# Patient Record
Sex: FEMALE | Race: WHITE | NOT HISPANIC OR LATINO
[De-identification: names, ages, dates, MRNs, and addresses within clinical notes are randomized per-mention and may not be internally consistent; named-entity substitution may affect disease eponyms.]

---

## 2017-01-30 ENCOUNTER — APPOINTMENT (OUTPATIENT)
Dept: SURGERY | Facility: CLINIC | Age: 67
End: 2017-01-30

## 2017-01-30 DIAGNOSIS — C73 MALIGNANT NEOPLASM OF THYROID GLAND: ICD-10-CM

## 2017-07-18 ENCOUNTER — APPOINTMENT (OUTPATIENT)
Dept: NEUROSURGERY | Facility: CLINIC | Age: 67
End: 2017-07-18

## 2017-07-18 VITALS
HEART RATE: 72 BPM | HEIGHT: 66 IN | BODY MASS INDEX: 36.64 KG/M2 | DIASTOLIC BLOOD PRESSURE: 80 MMHG | WEIGHT: 228 LBS | SYSTOLIC BLOOD PRESSURE: 130 MMHG

## 2017-07-24 ENCOUNTER — FORM ENCOUNTER (OUTPATIENT)
Age: 67
End: 2017-07-24

## 2017-07-25 ENCOUNTER — APPOINTMENT (OUTPATIENT)
Dept: NEUROSURGERY | Facility: CLINIC | Age: 67
End: 2017-07-25

## 2017-07-25 ENCOUNTER — OUTPATIENT (OUTPATIENT)
Dept: OUTPATIENT SERVICES | Facility: HOSPITAL | Age: 67
LOS: 1 days | End: 2017-07-25
Payer: COMMERCIAL

## 2017-07-25 ENCOUNTER — APPOINTMENT (OUTPATIENT)
Dept: CT IMAGING | Facility: CLINIC | Age: 67
End: 2017-07-25

## 2017-07-25 VITALS
WEIGHT: 228 LBS | SYSTOLIC BLOOD PRESSURE: 138 MMHG | HEIGHT: 66 IN | BODY MASS INDEX: 36.64 KG/M2 | HEART RATE: 59 BPM | DIASTOLIC BLOOD PRESSURE: 71 MMHG

## 2017-07-25 DIAGNOSIS — D49.7 NEOPLASM OF UNSPECIFIED BEHAVIOR OF ENDOCRINE GLANDS AND OTHER PARTS OF NERVOUS SYSTEM: ICD-10-CM

## 2017-07-25 DIAGNOSIS — M85.89 OTHER SPECIFIED DISORDERS OF BONE DENSITY AND STRUCTURE, MULTIPLE SITES: ICD-10-CM

## 2017-07-25 DIAGNOSIS — N20.0 CALCULUS OF KIDNEY: ICD-10-CM

## 2017-07-25 DIAGNOSIS — N13.30 UNSPECIFIED HYDRONEPHROSIS: ICD-10-CM

## 2017-07-25 PROCEDURE — 74177 CT ABD & PELVIS W/CONTRAST: CPT

## 2017-07-25 PROCEDURE — 82565 ASSAY OF CREATININE: CPT

## 2017-07-25 PROCEDURE — 71260 CT THORAX DX C+: CPT

## 2017-07-29 ENCOUNTER — FORM ENCOUNTER (OUTPATIENT)
Age: 67
End: 2017-07-29

## 2017-07-30 ENCOUNTER — OUTPATIENT (OUTPATIENT)
Dept: OUTPATIENT SERVICES | Facility: HOSPITAL | Age: 67
LOS: 1 days | End: 2017-07-30
Payer: COMMERCIAL

## 2017-07-30 ENCOUNTER — FORM ENCOUNTER (OUTPATIENT)
Age: 67
End: 2017-07-30

## 2017-07-30 ENCOUNTER — APPOINTMENT (OUTPATIENT)
Dept: MRI IMAGING | Facility: CLINIC | Age: 67
End: 2017-07-30
Payer: COMMERCIAL

## 2017-07-30 DIAGNOSIS — D49.7 NEOPLASM OF UNSPECIFIED BEHAVIOR OF ENDOCRINE GLANDS AND OTHER PARTS OF NERVOUS SYSTEM: ICD-10-CM

## 2017-07-30 PROCEDURE — A9585: CPT

## 2017-07-30 PROCEDURE — 72156 MRI NECK SPINE W/O & W/DYE: CPT | Mod: 26

## 2017-07-30 PROCEDURE — 70553 MRI BRAIN STEM W/O & W/DYE: CPT | Mod: 26

## 2017-07-30 PROCEDURE — 82565 ASSAY OF CREATININE: CPT

## 2017-07-30 PROCEDURE — 70553 MRI BRAIN STEM W/O & W/DYE: CPT

## 2017-07-30 PROCEDURE — 72156 MRI NECK SPINE W/O & W/DYE: CPT

## 2017-07-31 ENCOUNTER — OUTPATIENT (OUTPATIENT)
Dept: OUTPATIENT SERVICES | Facility: HOSPITAL | Age: 67
LOS: 1 days | End: 2017-07-31
Payer: COMMERCIAL

## 2017-07-31 ENCOUNTER — APPOINTMENT (OUTPATIENT)
Dept: SURGERY | Facility: CLINIC | Age: 67
End: 2017-07-31

## 2017-07-31 ENCOUNTER — APPOINTMENT (OUTPATIENT)
Dept: MRI IMAGING | Facility: CLINIC | Age: 67
End: 2017-07-31
Payer: COMMERCIAL

## 2017-07-31 VITALS
RESPIRATION RATE: 14 BRPM | TEMPERATURE: 97 F | DIASTOLIC BLOOD PRESSURE: 84 MMHG | HEART RATE: 60 BPM | WEIGHT: 235.89 LBS | SYSTOLIC BLOOD PRESSURE: 130 MMHG | HEIGHT: 64 IN

## 2017-07-31 DIAGNOSIS — G56.00 CARPAL TUNNEL SYNDROME, UNSPECIFIED UPPER LIMB: Chronic | ICD-10-CM

## 2017-07-31 DIAGNOSIS — D49.7 NEOPLASM OF UNSPECIFIED BEHAVIOR OF ENDOCRINE GLANDS AND OTHER PARTS OF NERVOUS SYSTEM: ICD-10-CM

## 2017-07-31 DIAGNOSIS — E89.0 POSTPROCEDURAL HYPOTHYROIDISM: Chronic | ICD-10-CM

## 2017-07-31 DIAGNOSIS — E11.9 TYPE 2 DIABETES MELLITUS WITHOUT COMPLICATIONS: ICD-10-CM

## 2017-07-31 DIAGNOSIS — D49.1 NEOPLASM OF UNSPECIFIED BEHAVIOR OF RESPIRATORY SYSTEM: ICD-10-CM

## 2017-07-31 DIAGNOSIS — E66.9 OBESITY, UNSPECIFIED: ICD-10-CM

## 2017-07-31 DIAGNOSIS — Z00.8 ENCOUNTER FOR OTHER GENERAL EXAMINATION: ICD-10-CM

## 2017-07-31 LAB
BLD GP AB SCN SERPL QL: NEGATIVE — SIGNIFICANT CHANGE UP
BUN SERPL-MCNC: 24 MG/DL — HIGH (ref 7–23)
CALCIUM SERPL-MCNC: 9.3 MG/DL — SIGNIFICANT CHANGE UP (ref 8.4–10.5)
CHLORIDE SERPL-SCNC: 98 MMOL/L — SIGNIFICANT CHANGE UP (ref 98–107)
CO2 SERPL-SCNC: 28 MMOL/L — SIGNIFICANT CHANGE UP (ref 22–31)
CREAT SERPL-MCNC: 1.72 MG/DL — HIGH (ref 0.5–1.3)
GLUCOSE SERPL-MCNC: 112 MG/DL — HIGH (ref 70–99)
HBA1C BLD-MCNC: 6 % — HIGH (ref 4–5.6)
HCT VFR BLD CALC: 43.5 % — SIGNIFICANT CHANGE UP (ref 34.5–45)
HGB BLD-MCNC: 13.9 G/DL — SIGNIFICANT CHANGE UP (ref 11.5–15.5)
MCHC RBC-ENTMCNC: 28.7 PG — SIGNIFICANT CHANGE UP (ref 27–34)
MCHC RBC-ENTMCNC: 32 % — SIGNIFICANT CHANGE UP (ref 32–36)
MCV RBC AUTO: 89.9 FL — SIGNIFICANT CHANGE UP (ref 80–100)
NRBC # FLD: 0 — SIGNIFICANT CHANGE UP
PLATELET # BLD AUTO: 289 K/UL — SIGNIFICANT CHANGE UP (ref 150–400)
PMV BLD: 10.3 FL — SIGNIFICANT CHANGE UP (ref 7–13)
POTASSIUM SERPL-MCNC: 4.1 MMOL/L — SIGNIFICANT CHANGE UP (ref 3.5–5.3)
POTASSIUM SERPL-SCNC: 4.1 MMOL/L — SIGNIFICANT CHANGE UP (ref 3.5–5.3)
RBC # BLD: 4.84 M/UL — SIGNIFICANT CHANGE UP (ref 3.8–5.2)
RBC # FLD: 13.4 % — SIGNIFICANT CHANGE UP (ref 10.3–14.5)
RH IG SCN BLD-IMP: NEGATIVE — SIGNIFICANT CHANGE UP
SODIUM SERPL-SCNC: 141 MMOL/L — SIGNIFICANT CHANGE UP (ref 135–145)
WBC # BLD: 10.15 K/UL — SIGNIFICANT CHANGE UP (ref 3.8–10.5)
WBC # FLD AUTO: 10.15 K/UL — SIGNIFICANT CHANGE UP (ref 3.8–10.5)

## 2017-07-31 PROCEDURE — A9585: CPT

## 2017-07-31 PROCEDURE — 93010 ELECTROCARDIOGRAM REPORT: CPT

## 2017-07-31 PROCEDURE — 72158 MRI LUMBAR SPINE W/O & W/DYE: CPT | Mod: 26

## 2017-07-31 PROCEDURE — 72157 MRI CHEST SPINE W/O & W/DYE: CPT | Mod: 26

## 2017-07-31 PROCEDURE — 72158 MRI LUMBAR SPINE W/O & W/DYE: CPT

## 2017-07-31 PROCEDURE — 72157 MRI CHEST SPINE W/O & W/DYE: CPT

## 2017-07-31 PROCEDURE — 82565 ASSAY OF CREATININE: CPT

## 2017-07-31 RX ORDER — SODIUM CHLORIDE 9 MG/ML
1000 INJECTION, SOLUTION INTRAVENOUS
Qty: 0 | Refills: 0 | Status: DISCONTINUED | OUTPATIENT
Start: 2017-08-02 | End: 2017-08-02

## 2017-07-31 NOTE — H&P PST ADULT - PROBLEM SELECTOR PLAN 1
Pt scheduled for lumbar laminectomy for resection of intradural tumor on 8/2/2017.  labs done results pending, ekg done.  Hibiclens provided.  Preop teaching done, pt able to verbalize understanding. Pt scheduled for lumbar laminectomy T 12- L 2 for resection of intradural tumor on 8/2/2017.  labs done results pending, ekg done.  Hibiclens provided.  Preop teaching done, pt able to verbalize understanding.  Spoke with Divya surgical coordinator Laminectomy will be from T 12 to L2

## 2017-07-31 NOTE — H&P PST ADULT - NEGATIVE GENERAL GENITOURINARY SYMPTOMS
no dysuria/normal urinary frequency/no flank pain L/no urinary hesitancy/no bladder infections/no hematuria/no flank pain R

## 2017-07-31 NOTE — H&P PST ADULT - RS GEN PE MLT RESP DETAILS PC
respirations non-labored/no wheezes/no rhonchi/breath sounds equal/no intercostal retractions/no rales/normal/good air movement/clear to auscultation bilaterally/no chest wall tenderness breath sounds equal/no chest wall tenderness/respirations non-labored/good air movement/no rhonchi/no intercostal retractions/clear to auscultation bilaterally/no rales/no wheezes

## 2017-07-31 NOTE — H&P PST ADULT - NEGATIVE GENERAL SYMPTOMS
no chills/no malaise/no anorexia/no sweating/no fatigue/no weight loss/no polyphagia/no polydipsia/no weight gain/no polyuria/no fever

## 2017-07-31 NOTE — H&P PST ADULT - PMH
Arthritis    Diabetes mellitus  type 2  Hyperlipidemia    Hypertension    Malignant neoplasm thyroid  tx with radioactive iodiene 2012  Neuropathy    Spinal stenosis    Stomach ulcer  1980  Vitamin B 12 deficiency Arthritis    Diabetes mellitus  type 2  Hyperlipidemia    Hypertension    Intradural tumor    Malignant neoplasm thyroid  tx with radioactive iodiene 2012  Neuropathy    Obese    Shingles  2016  Spinal stenosis    Stomach ulcer  1980  Vitamin B 12 deficiency

## 2017-07-31 NOTE — H&P PST ADULT - NEGATIVE CARDIOVASCULAR SYMPTOMS
no palpitations/no orthopnea/no peripheral edema/no chest pain/no claudication/no paroxysmal nocturnal dyspnea/no dyspnea on exertion

## 2017-07-31 NOTE — H&P PST ADULT - NEGATIVE BREAST SYMPTOMS
no breast tenderness L/no nipple discharge R/no nipple discharge L/no breast lump R/no breast lump L/no breast tenderness R

## 2017-07-31 NOTE — H&P PST ADULT - PROBLEM SELECTOR PLAN 2
DM type 2 on insulin, instructed to take toujeo 36 units night prior to surgery, no insulin on the day of surgery

## 2017-07-31 NOTE — H&P PST ADULT - PSH
appendectomy  1967  cholecystectomy  1980  Kidney stones  1990  laminectomy  2001  lipoma removed  9/2011 left side of neck  tonsillectomy  1968 appendectomy  1967  Carpal tunnel syndrome  2015  cholecystectomy  1980  Kidney stones  1990  laminectomy  2001  lipoma removed  9/2011 left side of neck  S/P thyroidectomy  2012  tonsillectomy  1968

## 2017-07-31 NOTE — H&P PST ADULT - GASTROINTESTINAL DETAILS
no rigidity/nontender/no masses palpable/no guarding/no bruit/no distention/normal/no organomegaly/no rebound tenderness/bowel sounds normal/soft

## 2017-07-31 NOTE — H&P PST ADULT - NSANTHOSAYNRD_GEN_A_CORE
No. MEL screening performed.  STOP BANG Legend: 0-2 = LOW Risk; 3-4 = INTERMEDIATE Risk; 5-8 = HIGH Risk

## 2017-07-31 NOTE — H&P PST ADULT - HISTORY OF PRESENT ILLNESS
68y/o female scheduled for lumbar laminectomy for resection of intradural tumor on 8/2/2017.  Pt states, "hx herniated disc, since 1/2017 has back pain, xray showed vertebrae was being pressed, MRI showed mass.  Continues to have some back pain, more on the left side." 66y/o female scheduled for lumbar laminectomy T12- L2 for resection of intradural tumor on 8/2/2017.  Pt states, "hx herniated disc, since 1/2017 has back pain, xray showed vertebrae was being pressed, MRI showed mass.  Continues to have some back pain, more on the left side."

## 2017-07-31 NOTE — H&P PST ADULT - ENDOCRINE COMMENTS
hx of thyroid cancer tx with surgery and radioactive iodine, tsh followed by endocrinologist routinely, dm type 2 bs 119

## 2017-07-31 NOTE — H&P PST ADULT - CONSTITUTIONAL DETAILS
well-groomed/well-developed/well-nourished/no distress well-nourished/obese/well-groomed/no distress/well-developed

## 2017-07-31 NOTE — H&P PST ADULT - MUSCULOSKELETAL COMMENTS
bilateral feet with arthritis and neuropathy, low back pain radiating towards the left side low back pain with rom bilateral feet with arthritis and neuropathy, low back pain radiating towards the left side, leg cramping

## 2017-08-01 NOTE — ASU PATIENT PROFILE, ADULT - PSH
appendectomy  1967  Carpal tunnel syndrome  2015  cholecystectomy  1980  Kidney stones  1990  laminectomy  2001  lipoma removed  9/2011 left side of neck  S/P thyroidectomy  2012  tonsillectomy  1968

## 2017-08-01 NOTE — ASU PATIENT PROFILE, ADULT - PMH
Arthritis    Diabetes mellitus  type 2  Hyperlipidemia    Hypertension    Intradural tumor    Malignant neoplasm thyroid  tx with radioactive iodiene 2012  Neuropathy    Obese    Shingles  2016  Spinal stenosis    Stomach ulcer  1980  Vitamin B 12 deficiency

## 2017-08-02 ENCOUNTER — TRANSCRIPTION ENCOUNTER (OUTPATIENT)
Age: 67
End: 2017-08-02

## 2017-08-02 ENCOUNTER — RESULT REVIEW (OUTPATIENT)
Age: 67
End: 2017-08-02

## 2017-08-02 ENCOUNTER — INPATIENT (INPATIENT)
Facility: HOSPITAL | Age: 67
LOS: 7 days | Discharge: NURSING, REHAB OR LONG-TERM CARE | End: 2017-08-10
Attending: STUDENT IN AN ORGANIZED HEALTH CARE EDUCATION/TRAINING PROGRAM | Admitting: STUDENT IN AN ORGANIZED HEALTH CARE EDUCATION/TRAINING PROGRAM
Payer: COMMERCIAL

## 2017-08-02 ENCOUNTER — APPOINTMENT (OUTPATIENT)
Dept: NEUROSURGERY | Facility: HOSPITAL | Age: 67
End: 2017-08-02

## 2017-08-02 VITALS
RESPIRATION RATE: 16 BRPM | HEIGHT: 64 IN | TEMPERATURE: 98 F | DIASTOLIC BLOOD PRESSURE: 57 MMHG | HEART RATE: 60 BPM | WEIGHT: 235.89 LBS | SYSTOLIC BLOOD PRESSURE: 138 MMHG | OXYGEN SATURATION: 97 %

## 2017-08-02 DIAGNOSIS — D49.7 NEOPLASM OF UNSPECIFIED BEHAVIOR OF ENDOCRINE GLANDS AND OTHER PARTS OF NERVOUS SYSTEM: ICD-10-CM

## 2017-08-02 DIAGNOSIS — G56.00 CARPAL TUNNEL SYNDROME, UNSPECIFIED UPPER LIMB: Chronic | ICD-10-CM

## 2017-08-02 DIAGNOSIS — E89.0 POSTPROCEDURAL HYPOTHYROIDISM: Chronic | ICD-10-CM

## 2017-08-02 LAB
BASE EXCESS BLDA CALC-SCNC: -0.3 MMOL/L — SIGNIFICANT CHANGE UP
BASE EXCESS BLDA CALC-SCNC: -0.9 MMOL/L — SIGNIFICANT CHANGE UP
BASE EXCESS BLDA CALC-SCNC: 0 MMOL/L — SIGNIFICANT CHANGE UP
BASOPHILS # BLD AUTO: 0.04 K/UL — SIGNIFICANT CHANGE UP (ref 0–0.2)
BASOPHILS NFR BLD AUTO: 0.3 % — SIGNIFICANT CHANGE UP (ref 0–2)
CA-I BLDA-SCNC: 1.17 MMOL/L — SIGNIFICANT CHANGE UP (ref 1.15–1.29)
CA-I BLDA-SCNC: 1.18 MMOL/L — SIGNIFICANT CHANGE UP (ref 1.15–1.29)
CA-I BLDA-SCNC: 1.2 MMOL/L — SIGNIFICANT CHANGE UP (ref 1.15–1.29)
EOSINOPHIL # BLD AUTO: 0.01 K/UL — SIGNIFICANT CHANGE UP (ref 0–0.5)
EOSINOPHIL NFR BLD AUTO: 0.1 % — SIGNIFICANT CHANGE UP (ref 0–6)
GLUCOSE BLDA-MCNC: 179 MG/DL — HIGH (ref 70–99)
GLUCOSE BLDA-MCNC: 235 MG/DL — HIGH (ref 70–99)
GLUCOSE BLDA-MCNC: 235 MG/DL — HIGH (ref 70–99)
HCO3 BLDA-SCNC: 23 MMOL/L — SIGNIFICANT CHANGE UP (ref 22–26)
HCO3 BLDA-SCNC: 24 MMOL/L — SIGNIFICANT CHANGE UP (ref 22–26)
HCO3 BLDA-SCNC: 25 MMOL/L — SIGNIFICANT CHANGE UP (ref 22–26)
HCT VFR BLD CALC: 39 % — SIGNIFICANT CHANGE UP (ref 34.5–45)
HCT VFR BLDA CALC: 38.3 % — SIGNIFICANT CHANGE UP (ref 34.5–46.5)
HCT VFR BLDA CALC: 39.2 % — SIGNIFICANT CHANGE UP (ref 34.5–46.5)
HCT VFR BLDA CALC: 40.6 % — SIGNIFICANT CHANGE UP (ref 34.5–46.5)
HGB BLD-MCNC: 12.8 G/DL — SIGNIFICANT CHANGE UP (ref 11.5–15.5)
HGB BLDA-MCNC: 12.5 G/DL — SIGNIFICANT CHANGE UP (ref 11.5–15.5)
HGB BLDA-MCNC: 12.7 G/DL — SIGNIFICANT CHANGE UP (ref 11.5–15.5)
HGB BLDA-MCNC: 13.2 G/DL — SIGNIFICANT CHANGE UP (ref 11.5–15.5)
IMM GRANULOCYTES # BLD AUTO: 0.22 # — SIGNIFICANT CHANGE UP
IMM GRANULOCYTES NFR BLD AUTO: 1.5 % — SIGNIFICANT CHANGE UP (ref 0–1.5)
LYMPHOCYTES # BLD AUTO: 1.67 K/UL — SIGNIFICANT CHANGE UP (ref 1–3.3)
LYMPHOCYTES # BLD AUTO: 11.4 % — LOW (ref 13–44)
MCHC RBC-ENTMCNC: 28.4 PG — SIGNIFICANT CHANGE UP (ref 27–34)
MCHC RBC-ENTMCNC: 32.8 % — SIGNIFICANT CHANGE UP (ref 32–36)
MCV RBC AUTO: 86.5 FL — SIGNIFICANT CHANGE UP (ref 80–100)
MONOCYTES # BLD AUTO: 0.43 K/UL — SIGNIFICANT CHANGE UP (ref 0–0.9)
MONOCYTES NFR BLD AUTO: 2.9 % — SIGNIFICANT CHANGE UP (ref 2–14)
NEUTROPHILS # BLD AUTO: 12.29 K/UL — HIGH (ref 1.8–7.4)
NEUTROPHILS NFR BLD AUTO: 83.8 % — HIGH (ref 43–77)
NRBC # FLD: 0 — SIGNIFICANT CHANGE UP
PCO2 BLDA: 36 MMHG — SIGNIFICANT CHANGE UP (ref 32–48)
PCO2 BLDA: 39 MMHG — SIGNIFICANT CHANGE UP (ref 32–48)
PCO2 BLDA: 44 MMHG — SIGNIFICANT CHANGE UP (ref 32–48)
PH BLDA: 7.35 PH — SIGNIFICANT CHANGE UP (ref 7.35–7.45)
PH BLDA: 7.41 PH — SIGNIFICANT CHANGE UP (ref 7.35–7.45)
PH BLDA: 7.44 PH — SIGNIFICANT CHANGE UP (ref 7.35–7.45)
PLATELET # BLD AUTO: 290 K/UL — SIGNIFICANT CHANGE UP (ref 150–400)
PMV BLD: 10.1 FL — SIGNIFICANT CHANGE UP (ref 7–13)
PO2 BLDA: 184 MMHG — HIGH (ref 83–108)
PO2 BLDA: 188 MMHG — HIGH (ref 83–108)
PO2 BLDA: 196 MMHG — HIGH (ref 83–108)
POTASSIUM BLDA-SCNC: 4.2 MMOL/L — SIGNIFICANT CHANGE UP (ref 3.4–4.5)
POTASSIUM BLDA-SCNC: 4.3 MMOL/L — SIGNIFICANT CHANGE UP (ref 3.4–4.5)
POTASSIUM BLDA-SCNC: 4.4 MMOL/L — SIGNIFICANT CHANGE UP (ref 3.4–4.5)
RBC # BLD: 4.51 M/UL — SIGNIFICANT CHANGE UP (ref 3.8–5.2)
RBC # FLD: 13.3 % — SIGNIFICANT CHANGE UP (ref 10.3–14.5)
RH IG SCN BLD-IMP: NEGATIVE — SIGNIFICANT CHANGE UP
SAO2 % BLDA: 97.5 % — SIGNIFICANT CHANGE UP (ref 95–99)
SAO2 % BLDA: 98.9 % — SIGNIFICANT CHANGE UP (ref 95–99)
SAO2 % BLDA: 99 % — SIGNIFICANT CHANGE UP (ref 95–99)
SODIUM BLDA-SCNC: 135 MMOL/L — LOW (ref 136–146)
SODIUM BLDA-SCNC: 137 MMOL/L — SIGNIFICANT CHANGE UP (ref 136–146)
SODIUM BLDA-SCNC: 138 MMOL/L — SIGNIFICANT CHANGE UP (ref 136–146)
WBC # BLD: 14.66 K/UL — HIGH (ref 3.8–10.5)
WBC # FLD AUTO: 14.66 K/UL — HIGH (ref 3.8–10.5)

## 2017-08-02 PROCEDURE — 22842 INSERT SPINE FIXATION DEVICE: CPT

## 2017-08-02 PROCEDURE — 72100 X-RAY EXAM L-S SPINE 2/3 VWS: CPT | Mod: 26

## 2017-08-02 PROCEDURE — 88331 PATH CONSLTJ SURG 1 BLK 1SPC: CPT | Mod: 26

## 2017-08-02 PROCEDURE — 63282 BX/EXC IDRL SPINE LESN LMBR: CPT

## 2017-08-02 PROCEDURE — 88341 IMHCHEM/IMCYTCHM EA ADD ANTB: CPT | Mod: 26

## 2017-08-02 PROCEDURE — 88342 IMHCHEM/IMCYTCHM 1ST ANTB: CPT | Mod: 26

## 2017-08-02 PROCEDURE — 88334 PATH CONSLTJ SURG CYTO XM EA: CPT | Mod: 26,59

## 2017-08-02 PROCEDURE — 22612 ARTHRD PST TQ 1NTRSPC LUMBAR: CPT

## 2017-08-02 PROCEDURE — 22614 ARTHRD PST TQ 1NTRSPC EA ADD: CPT

## 2017-08-02 PROCEDURE — 88307 TISSUE EXAM BY PATHOLOGIST: CPT | Mod: 26

## 2017-08-02 RX ORDER — ONDANSETRON 8 MG/1
4 TABLET, FILM COATED ORAL EVERY 6 HOURS
Qty: 0 | Refills: 0 | Status: DISCONTINUED | OUTPATIENT
Start: 2017-08-02 | End: 2017-08-04

## 2017-08-02 RX ORDER — FENOFIBRATE,MICRONIZED 130 MG
1 CAPSULE ORAL
Qty: 0 | Refills: 0 | COMMUNITY

## 2017-08-02 RX ORDER — ACETAMINOPHEN 500 MG
650 TABLET ORAL EVERY 6 HOURS
Qty: 0 | Refills: 0 | Status: DISCONTINUED | OUTPATIENT
Start: 2017-08-02 | End: 2017-08-10

## 2017-08-02 RX ORDER — INSULIN LISPRO 100/ML
VIAL (ML) SUBCUTANEOUS
Qty: 0 | Refills: 0 | Status: DISCONTINUED | OUTPATIENT
Start: 2017-08-02 | End: 2017-08-10

## 2017-08-02 RX ORDER — NALOXONE HYDROCHLORIDE 4 MG/.1ML
0.1 SPRAY NASAL
Qty: 0 | Refills: 0 | Status: DISCONTINUED | OUTPATIENT
Start: 2017-08-02 | End: 2017-08-04

## 2017-08-02 RX ORDER — ATENOLOL 25 MG/1
1 TABLET ORAL
Qty: 0 | Refills: 0 | COMMUNITY

## 2017-08-02 RX ORDER — SENNA PLUS 8.6 MG/1
2 TABLET ORAL AT BEDTIME
Qty: 0 | Refills: 0 | Status: DISCONTINUED | OUTPATIENT
Start: 2017-08-02 | End: 2017-08-10

## 2017-08-02 RX ORDER — ENOXAPARIN SODIUM 100 MG/ML
40 INJECTION SUBCUTANEOUS DAILY
Qty: 0 | Refills: 0 | Status: DISCONTINUED | OUTPATIENT
Start: 2017-08-03 | End: 2017-08-10

## 2017-08-02 RX ORDER — DEXTROSE 50 % IN WATER 50 %
1 SYRINGE (ML) INTRAVENOUS ONCE
Qty: 0 | Refills: 0 | Status: DISCONTINUED | OUTPATIENT
Start: 2017-08-02 | End: 2017-08-10

## 2017-08-02 RX ORDER — DOCUSATE SODIUM 100 MG
100 CAPSULE ORAL THREE TIMES A DAY
Qty: 0 | Refills: 0 | Status: DISCONTINUED | OUTPATIENT
Start: 2017-08-02 | End: 2017-08-10

## 2017-08-02 RX ORDER — ONDANSETRON 8 MG/1
4 TABLET, FILM COATED ORAL ONCE
Qty: 0 | Refills: 0 | Status: COMPLETED | OUTPATIENT
Start: 2017-08-02 | End: 2017-08-03

## 2017-08-02 RX ORDER — INSULIN GLARGINE 100 [IU]/ML
72 INJECTION, SOLUTION SUBCUTANEOUS
Qty: 0 | Refills: 0 | COMMUNITY

## 2017-08-02 RX ORDER — EZETIMIBE 10 MG/1
1 TABLET ORAL
Qty: 0 | Refills: 0 | COMMUNITY

## 2017-08-02 RX ORDER — HYDROMORPHONE HYDROCHLORIDE 2 MG/ML
30 INJECTION INTRAMUSCULAR; INTRAVENOUS; SUBCUTANEOUS
Qty: 0 | Refills: 0 | Status: DISCONTINUED | OUTPATIENT
Start: 2017-08-02 | End: 2017-08-04

## 2017-08-02 RX ORDER — DEXTROSE MONOHYDRATE, SODIUM CHLORIDE, AND POTASSIUM CHLORIDE 50; .745; 4.5 G/1000ML; G/1000ML; G/1000ML
1000 INJECTION, SOLUTION INTRAVENOUS
Qty: 0 | Refills: 0 | Status: DISCONTINUED | OUTPATIENT
Start: 2017-08-02 | End: 2017-08-05

## 2017-08-02 RX ORDER — HYDROMORPHONE HYDROCHLORIDE 2 MG/ML
1 INJECTION INTRAMUSCULAR; INTRAVENOUS; SUBCUTANEOUS
Qty: 0 | Refills: 0 | Status: DISCONTINUED | OUTPATIENT
Start: 2017-08-02 | End: 2017-08-02

## 2017-08-02 RX ORDER — FENOFIBRATE,MICRONIZED 130 MG
145 CAPSULE ORAL DAILY
Qty: 0 | Refills: 0 | Status: DISCONTINUED | OUTPATIENT
Start: 2017-08-02 | End: 2017-08-10

## 2017-08-02 RX ORDER — ACETAMINOPHEN 500 MG
650 TABLET ORAL EVERY 6 HOURS
Qty: 0 | Refills: 0 | Status: DISCONTINUED | OUTPATIENT
Start: 2017-08-02 | End: 2017-08-03

## 2017-08-02 RX ORDER — INSULIN ASPART 100 [IU]/ML
1 INJECTION, SOLUTION SUBCUTANEOUS
Qty: 0 | Refills: 0 | COMMUNITY

## 2017-08-02 RX ORDER — LISINOPRIL 2.5 MG/1
40 TABLET ORAL DAILY
Qty: 0 | Refills: 0 | Status: DISCONTINUED | OUTPATIENT
Start: 2017-08-02 | End: 2017-08-10

## 2017-08-02 RX ORDER — RAMIPRIL 5 MG
1 CAPSULE ORAL
Qty: 0 | Refills: 0 | COMMUNITY

## 2017-08-02 RX ORDER — OMEPRAZOLE 10 MG/1
1 CAPSULE, DELAYED RELEASE ORAL
Qty: 0 | Refills: 0 | COMMUNITY

## 2017-08-02 RX ORDER — HYDROMORPHONE HYDROCHLORIDE 2 MG/ML
1 INJECTION INTRAMUSCULAR; INTRAVENOUS; SUBCUTANEOUS
Qty: 0 | Refills: 0 | Status: DISCONTINUED | OUTPATIENT
Start: 2017-08-02 | End: 2017-08-04

## 2017-08-02 RX ORDER — PREGABALIN 225 MG/1
1 CAPSULE ORAL
Qty: 0 | Refills: 0 | COMMUNITY

## 2017-08-02 RX ORDER — LEVOTHYROXINE SODIUM 125 MCG
1 TABLET ORAL
Qty: 0 | Refills: 0 | COMMUNITY

## 2017-08-02 RX ORDER — GABAPENTIN 400 MG/1
300 CAPSULE ORAL DAILY
Qty: 0 | Refills: 0 | Status: DISCONTINUED | OUTPATIENT
Start: 2017-08-02 | End: 2017-08-03

## 2017-08-02 RX ORDER — INSULIN LISPRO 100/ML
VIAL (ML) SUBCUTANEOUS AT BEDTIME
Qty: 0 | Refills: 0 | Status: DISCONTINUED | OUTPATIENT
Start: 2017-08-02 | End: 2017-08-07

## 2017-08-02 RX ORDER — DEXTROSE 50 % IN WATER 50 %
25 SYRINGE (ML) INTRAVENOUS ONCE
Qty: 0 | Refills: 0 | Status: DISCONTINUED | OUTPATIENT
Start: 2017-08-02 | End: 2017-08-10

## 2017-08-02 RX ORDER — PANTOPRAZOLE SODIUM 20 MG/1
40 TABLET, DELAYED RELEASE ORAL
Qty: 0 | Refills: 0 | Status: DISCONTINUED | OUTPATIENT
Start: 2017-08-02 | End: 2017-08-10

## 2017-08-02 RX ORDER — ATENOLOL 25 MG/1
50 TABLET ORAL DAILY
Qty: 0 | Refills: 0 | Status: DISCONTINUED | OUTPATIENT
Start: 2017-08-02 | End: 2017-08-10

## 2017-08-02 RX ORDER — LEVOTHYROXINE SODIUM 125 MCG
200 TABLET ORAL DAILY
Qty: 0 | Refills: 0 | Status: DISCONTINUED | OUTPATIENT
Start: 2017-08-02 | End: 2017-08-10

## 2017-08-02 RX ORDER — CEFAZOLIN SODIUM 1 G
2000 VIAL (EA) INJECTION EVERY 8 HOURS
Qty: 0 | Refills: 0 | Status: COMPLETED | OUTPATIENT
Start: 2017-08-02 | End: 2017-08-03

## 2017-08-02 RX ORDER — GLUCAGON INJECTION, SOLUTION 0.5 MG/.1ML
1 INJECTION, SOLUTION SUBCUTANEOUS ONCE
Qty: 0 | Refills: 0 | Status: DISCONTINUED | OUTPATIENT
Start: 2017-08-02 | End: 2017-08-10

## 2017-08-02 RX ORDER — SODIUM CHLORIDE 9 MG/ML
1000 INJECTION, SOLUTION INTRAVENOUS
Qty: 0 | Refills: 0 | Status: DISCONTINUED | OUTPATIENT
Start: 2017-08-02 | End: 2017-08-03

## 2017-08-02 RX ORDER — LANOLIN ALCOHOL/MO/W.PET/CERES
1 CREAM (GRAM) TOPICAL
Qty: 0 | Refills: 0 | COMMUNITY

## 2017-08-02 RX ORDER — PREGABALIN 225 MG/1
1000 CAPSULE ORAL DAILY
Qty: 0 | Refills: 0 | Status: DISCONTINUED | OUTPATIENT
Start: 2017-08-02 | End: 2017-08-10

## 2017-08-02 RX ORDER — GABAPENTIN 400 MG/1
1 CAPSULE ORAL
Qty: 0 | Refills: 0 | COMMUNITY

## 2017-08-02 RX ORDER — DEXTROSE 50 % IN WATER 50 %
12.5 SYRINGE (ML) INTRAVENOUS ONCE
Qty: 0 | Refills: 0 | Status: DISCONTINUED | OUTPATIENT
Start: 2017-08-02 | End: 2017-08-10

## 2017-08-02 RX ADMIN — HYDROMORPHONE HYDROCHLORIDE 1 MILLIGRAM(S): 2 INJECTION INTRAMUSCULAR; INTRAVENOUS; SUBCUTANEOUS at 23:52

## 2017-08-02 RX ADMIN — HYDROMORPHONE HYDROCHLORIDE 1 MILLIGRAM(S): 2 INJECTION INTRAMUSCULAR; INTRAVENOUS; SUBCUTANEOUS at 23:35

## 2017-08-02 RX ADMIN — SODIUM CHLORIDE 30 MILLILITER(S): 9 INJECTION, SOLUTION INTRAVENOUS at 09:49

## 2017-08-02 RX ADMIN — HYDROMORPHONE HYDROCHLORIDE 30 MILLILITER(S): 2 INJECTION INTRAMUSCULAR; INTRAVENOUS; SUBCUTANEOUS at 23:53

## 2017-08-02 RX ADMIN — DEXTROSE MONOHYDRATE, SODIUM CHLORIDE, AND POTASSIUM CHLORIDE 75 MILLILITER(S): 50; .745; 4.5 INJECTION, SOLUTION INTRAVENOUS at 23:15

## 2017-08-02 NOTE — BRIEF OPERATIVE NOTE - PROCEDURE
Laminectomy and fusion of lumbar spine with instrumentation  08/02/2017  and resection of tumor  Active  KUSH

## 2017-08-03 DIAGNOSIS — I10 ESSENTIAL (PRIMARY) HYPERTENSION: ICD-10-CM

## 2017-08-03 DIAGNOSIS — Z98.890 OTHER SPECIFIED POSTPROCEDURAL STATES: ICD-10-CM

## 2017-08-03 LAB
BASOPHILS # BLD AUTO: 0.03 K/UL — SIGNIFICANT CHANGE UP (ref 0–0.2)
BASOPHILS # BLD AUTO: 0.04 K/UL — SIGNIFICANT CHANGE UP (ref 0–0.2)
BASOPHILS NFR BLD AUTO: 0.2 % — SIGNIFICANT CHANGE UP (ref 0–2)
BASOPHILS NFR BLD AUTO: 0.2 % — SIGNIFICANT CHANGE UP (ref 0–2)
BUN SERPL-MCNC: 29 MG/DL — HIGH (ref 7–23)
BUN SERPL-MCNC: 29 MG/DL — HIGH (ref 7–23)
BUN SERPL-MCNC: 30 MG/DL — HIGH (ref 7–23)
CALCIUM SERPL-MCNC: 7.9 MG/DL — LOW (ref 8.4–10.5)
CALCIUM SERPL-MCNC: 8.5 MG/DL — SIGNIFICANT CHANGE UP (ref 8.4–10.5)
CALCIUM SERPL-MCNC: 8.9 MG/DL — SIGNIFICANT CHANGE UP (ref 8.4–10.5)
CHLORIDE SERPL-SCNC: 100 MMOL/L — SIGNIFICANT CHANGE UP (ref 98–107)
CHLORIDE SERPL-SCNC: 100 MMOL/L — SIGNIFICANT CHANGE UP (ref 98–107)
CHLORIDE SERPL-SCNC: 101 MMOL/L — SIGNIFICANT CHANGE UP (ref 98–107)
CO2 SERPL-SCNC: 19 MMOL/L — LOW (ref 22–31)
CO2 SERPL-SCNC: 20 MMOL/L — LOW (ref 22–31)
CO2 SERPL-SCNC: 21 MMOL/L — LOW (ref 22–31)
CREAT SERPL-MCNC: 1.56 MG/DL — HIGH (ref 0.5–1.3)
CREAT SERPL-MCNC: 1.57 MG/DL — HIGH (ref 0.5–1.3)
CREAT SERPL-MCNC: 1.61 MG/DL — HIGH (ref 0.5–1.3)
EOSINOPHIL # BLD AUTO: 0 K/UL — SIGNIFICANT CHANGE UP (ref 0–0.5)
EOSINOPHIL # BLD AUTO: 0 K/UL — SIGNIFICANT CHANGE UP (ref 0–0.5)
EOSINOPHIL NFR BLD AUTO: 0 % — SIGNIFICANT CHANGE UP (ref 0–6)
EOSINOPHIL NFR BLD AUTO: 0 % — SIGNIFICANT CHANGE UP (ref 0–6)
GLUCOSE SERPL-MCNC: 254 MG/DL — HIGH (ref 70–99)
GLUCOSE SERPL-MCNC: 267 MG/DL — HIGH (ref 70–99)
GLUCOSE SERPL-MCNC: 271 MG/DL — HIGH (ref 70–99)
HCT VFR BLD CALC: 36.8 % — SIGNIFICANT CHANGE UP (ref 34.5–45)
HCT VFR BLD CALC: 37.9 % — SIGNIFICANT CHANGE UP (ref 34.5–45)
HGB BLD-MCNC: 11.9 G/DL — SIGNIFICANT CHANGE UP (ref 11.5–15.5)
HGB BLD-MCNC: 12.1 G/DL — SIGNIFICANT CHANGE UP (ref 11.5–15.5)
IMM GRANULOCYTES # BLD AUTO: 0.17 # — SIGNIFICANT CHANGE UP
IMM GRANULOCYTES # BLD AUTO: 0.18 # — SIGNIFICANT CHANGE UP
IMM GRANULOCYTES NFR BLD AUTO: 0.9 % — SIGNIFICANT CHANGE UP (ref 0–1.5)
IMM GRANULOCYTES NFR BLD AUTO: 0.9 % — SIGNIFICANT CHANGE UP (ref 0–1.5)
LYMPHOCYTES # BLD AUTO: 1.34 K/UL — SIGNIFICANT CHANGE UP (ref 1–3.3)
LYMPHOCYTES # BLD AUTO: 1.45 K/UL — SIGNIFICANT CHANGE UP (ref 1–3.3)
LYMPHOCYTES # BLD AUTO: 6.9 % — LOW (ref 13–44)
LYMPHOCYTES # BLD AUTO: 7.6 % — LOW (ref 13–44)
MCHC RBC-ENTMCNC: 28.4 PG — SIGNIFICANT CHANGE UP (ref 27–34)
MCHC RBC-ENTMCNC: 28.5 PG — SIGNIFICANT CHANGE UP (ref 27–34)
MCHC RBC-ENTMCNC: 31.9 % — LOW (ref 32–36)
MCHC RBC-ENTMCNC: 32.3 % — SIGNIFICANT CHANGE UP (ref 32–36)
MCV RBC AUTO: 87.8 FL — SIGNIFICANT CHANGE UP (ref 80–100)
MCV RBC AUTO: 89.2 FL — SIGNIFICANT CHANGE UP (ref 80–100)
MONOCYTES # BLD AUTO: 1.54 K/UL — HIGH (ref 0–0.9)
MONOCYTES # BLD AUTO: 1.56 K/UL — HIGH (ref 0–0.9)
MONOCYTES NFR BLD AUTO: 7.9 % — SIGNIFICANT CHANGE UP (ref 2–14)
MONOCYTES NFR BLD AUTO: 8.2 % — SIGNIFICANT CHANGE UP (ref 2–14)
NEUTROPHILS # BLD AUTO: 15.8 K/UL — HIGH (ref 1.8–7.4)
NEUTROPHILS # BLD AUTO: 16.38 K/UL — HIGH (ref 1.8–7.4)
NEUTROPHILS NFR BLD AUTO: 83.1 % — HIGH (ref 43–77)
NEUTROPHILS NFR BLD AUTO: 84.1 % — HIGH (ref 43–77)
NRBC # FLD: 0 — SIGNIFICANT CHANGE UP
NRBC # FLD: 0 — SIGNIFICANT CHANGE UP
NRBC FLD-RTO: SIGNIFICANT CHANGE UP
PLATELET # BLD AUTO: 244 K/UL — SIGNIFICANT CHANGE UP (ref 150–400)
PLATELET # BLD AUTO: 267 K/UL — SIGNIFICANT CHANGE UP (ref 150–400)
PMV BLD: 10.1 FL — SIGNIFICANT CHANGE UP (ref 7–13)
PMV BLD: 10.3 FL — SIGNIFICANT CHANGE UP (ref 7–13)
POTASSIUM SERPL-MCNC: 4.5 MMOL/L — SIGNIFICANT CHANGE UP (ref 3.5–5.3)
POTASSIUM SERPL-MCNC: 4.6 MMOL/L — SIGNIFICANT CHANGE UP (ref 3.5–5.3)
POTASSIUM SERPL-MCNC: 4.8 MMOL/L — SIGNIFICANT CHANGE UP (ref 3.5–5.3)
POTASSIUM SERPL-SCNC: 4.5 MMOL/L — SIGNIFICANT CHANGE UP (ref 3.5–5.3)
POTASSIUM SERPL-SCNC: 4.6 MMOL/L — SIGNIFICANT CHANGE UP (ref 3.5–5.3)
POTASSIUM SERPL-SCNC: 4.8 MMOL/L — SIGNIFICANT CHANGE UP (ref 3.5–5.3)
RBC # BLD: 4.19 M/UL — SIGNIFICANT CHANGE UP (ref 3.8–5.2)
RBC # BLD: 4.25 M/UL — SIGNIFICANT CHANGE UP (ref 3.8–5.2)
RBC # FLD: 13.4 % — SIGNIFICANT CHANGE UP (ref 10.3–14.5)
RBC # FLD: 13.5 % — SIGNIFICANT CHANGE UP (ref 10.3–14.5)
SODIUM SERPL-SCNC: 137 MMOL/L — SIGNIFICANT CHANGE UP (ref 135–145)
SODIUM SERPL-SCNC: 138 MMOL/L — SIGNIFICANT CHANGE UP (ref 135–145)
SODIUM SERPL-SCNC: 139 MMOL/L — SIGNIFICANT CHANGE UP (ref 135–145)
WBC # BLD: 19.01 K/UL — HIGH (ref 3.8–10.5)
WBC # BLD: 19.48 K/UL — HIGH (ref 3.8–10.5)
WBC # FLD AUTO: 19.01 K/UL — HIGH (ref 3.8–10.5)
WBC # FLD AUTO: 19.48 K/UL — HIGH (ref 3.8–10.5)

## 2017-08-03 RX ORDER — INSULIN LISPRO 100/ML
4 VIAL (ML) SUBCUTANEOUS ONCE
Qty: 0 | Refills: 0 | Status: COMPLETED | OUTPATIENT
Start: 2017-08-03 | End: 2017-08-03

## 2017-08-03 RX ORDER — GABAPENTIN 400 MG/1
200 CAPSULE ORAL EVERY 8 HOURS
Qty: 0 | Refills: 0 | Status: DISCONTINUED | OUTPATIENT
Start: 2017-08-03 | End: 2017-08-07

## 2017-08-03 RX ORDER — ACETAMINOPHEN 500 MG
650 TABLET ORAL EVERY 6 HOURS
Qty: 0 | Refills: 0 | Status: COMPLETED | OUTPATIENT
Start: 2017-08-03 | End: 2017-08-04

## 2017-08-03 RX ORDER — ACETAMINOPHEN 500 MG
1000 TABLET ORAL ONCE
Qty: 0 | Refills: 0 | Status: COMPLETED | OUTPATIENT
Start: 2017-08-03 | End: 2017-08-03

## 2017-08-03 RX ORDER — TIZANIDINE 4 MG/1
2 TABLET ORAL EVERY 8 HOURS
Qty: 0 | Refills: 0 | Status: COMPLETED | OUTPATIENT
Start: 2017-08-03 | End: 2017-08-06

## 2017-08-03 RX ADMIN — HYDROMORPHONE HYDROCHLORIDE 1 MILLIGRAM(S): 2 INJECTION INTRAMUSCULAR; INTRAVENOUS; SUBCUTANEOUS at 00:40

## 2017-08-03 RX ADMIN — TIZANIDINE 2 MILLIGRAM(S): 4 TABLET ORAL at 21:33

## 2017-08-03 RX ADMIN — Medication 100 MILLIGRAM(S): at 07:01

## 2017-08-03 RX ADMIN — SENNA PLUS 2 TABLET(S): 8.6 TABLET ORAL at 21:32

## 2017-08-03 RX ADMIN — GABAPENTIN 200 MILLIGRAM(S): 400 CAPSULE ORAL at 21:32

## 2017-08-03 RX ADMIN — ONDANSETRON 4 MILLIGRAM(S): 8 TABLET, FILM COATED ORAL at 08:15

## 2017-08-03 RX ADMIN — Medication 100 MILLIGRAM(S): at 06:45

## 2017-08-03 RX ADMIN — Medication 4: at 11:59

## 2017-08-03 RX ADMIN — DEXTROSE MONOHYDRATE, SODIUM CHLORIDE, AND POTASSIUM CHLORIDE 75 MILLILITER(S): 50; .745; 4.5 INJECTION, SOLUTION INTRAVENOUS at 17:37

## 2017-08-03 RX ADMIN — Medication 650 MILLIGRAM(S): at 19:03

## 2017-08-03 RX ADMIN — HYDROMORPHONE HYDROCHLORIDE 30 MILLILITER(S): 2 INJECTION INTRAMUSCULAR; INTRAVENOUS; SUBCUTANEOUS at 17:36

## 2017-08-03 RX ADMIN — HYDROMORPHONE HYDROCHLORIDE 30 MILLILITER(S): 2 INJECTION INTRAMUSCULAR; INTRAVENOUS; SUBCUTANEOUS at 05:37

## 2017-08-03 RX ADMIN — Medication 100 MILLIGRAM(S): at 14:24

## 2017-08-03 RX ADMIN — DEXTROSE MONOHYDRATE, SODIUM CHLORIDE, AND POTASSIUM CHLORIDE 75 MILLILITER(S): 50; .745; 4.5 INJECTION, SOLUTION INTRAVENOUS at 13:30

## 2017-08-03 RX ADMIN — Medication 1000 MILLIGRAM(S): at 06:35

## 2017-08-03 RX ADMIN — GABAPENTIN 200 MILLIGRAM(S): 400 CAPSULE ORAL at 14:25

## 2017-08-03 RX ADMIN — Medication 4 UNIT(S): at 02:27

## 2017-08-03 RX ADMIN — TIZANIDINE 2 MILLIGRAM(S): 4 TABLET ORAL at 14:25

## 2017-08-03 RX ADMIN — Medication 650 MILLIGRAM(S): at 18:34

## 2017-08-03 RX ADMIN — Medication 100 MILLIGRAM(S): at 16:09

## 2017-08-03 RX ADMIN — Medication 100 MILLIGRAM(S): at 21:32

## 2017-08-03 RX ADMIN — Medication 400 MILLIGRAM(S): at 05:40

## 2017-08-03 RX ADMIN — HYDROMORPHONE HYDROCHLORIDE 30 MILLILITER(S): 2 INJECTION INTRAMUSCULAR; INTRAVENOUS; SUBCUTANEOUS at 20:10

## 2017-08-03 RX ADMIN — HYDROMORPHONE HYDROCHLORIDE 1 MILLIGRAM(S): 2 INJECTION INTRAMUSCULAR; INTRAVENOUS; SUBCUTANEOUS at 00:05

## 2017-08-03 RX ADMIN — Medication 4: at 07:14

## 2017-08-03 RX ADMIN — Medication 4: at 07:34

## 2017-08-03 RX ADMIN — LISINOPRIL 40 MILLIGRAM(S): 2.5 TABLET ORAL at 07:55

## 2017-08-03 RX ADMIN — Medication 2: at 15:55

## 2017-08-03 RX ADMIN — DEXTROSE MONOHYDRATE, SODIUM CHLORIDE, AND POTASSIUM CHLORIDE 75 MILLILITER(S): 50; .745; 4.5 INJECTION, SOLUTION INTRAVENOUS at 21:33

## 2017-08-03 RX ADMIN — Medication 200 MICROGRAM(S): at 07:55

## 2017-08-03 RX ADMIN — Medication 650 MILLIGRAM(S): at 14:25

## 2017-08-03 RX ADMIN — Medication 650 MILLIGRAM(S): at 13:21

## 2017-08-03 RX ADMIN — ONDANSETRON 4 MILLIGRAM(S): 8 TABLET, FILM COATED ORAL at 15:10

## 2017-08-03 RX ADMIN — PANTOPRAZOLE SODIUM 40 MILLIGRAM(S): 20 TABLET, DELAYED RELEASE ORAL at 07:04

## 2017-08-03 RX ADMIN — Medication 100 MILLIGRAM(S): at 21:35

## 2017-08-03 RX ADMIN — Medication 145 MILLIGRAM(S): at 14:24

## 2017-08-03 RX ADMIN — DEXTROSE MONOHYDRATE, SODIUM CHLORIDE, AND POTASSIUM CHLORIDE 75 MILLILITER(S): 50; .745; 4.5 INJECTION, SOLUTION INTRAVENOUS at 18:04

## 2017-08-03 NOTE — CONSULT NOTE ADULT - SUBJECTIVE AND OBJECTIVE BOX
SICU Consultation Note  =====================================================  HPI: 67y female with HTN, HLD, DMT2, arthritis presented for an elective lumbar laminectomy for resection of L1 schwannoma on 8/2/2017. Patient noticed back pain in January and a MRI was obtained, showing a mass.     Surgery Information  EBL: 400      UOP: 700          IV Fluids: 2500            PAST MEDICAL & SURGICAL HISTORY:  Obese  Shingles: 2016  Intradural tumor  Spinal stenosis  Malignant neoplasm thyroid: tx with radioactive iodiene 2012  Vitamin B 12 deficiency  Arthritis  Neuropathy  Hypertension  Diabetes mellitus: type 2  Hyperlipidemia  Stomach ulcer: 1980  Carpal tunnel syndrome: 2015  S/P thyroidectomy: 2012  Kidney stones: 1990  lipoma removed: 9/2011 left side of neck  laminectomy: 2001  cholecystectomy: 1980  tonsillectomy: 1968  appendectomy: 1967    Home Meds: Synthroid, gabapentin, atenolol, omeprazole, ramipril, Novolog  Allergies: Azithromycin  Soc: , 3 children  Advanced Directives: Presumed Full Code     ROS:    General: Non-Contributory  Skin/Breast: Non-Contributory  Ophthalmologic: Non-Contributory  ENMT: Non-Contributory  Respiratory and Thorax: Non-Contributory  Cardiovascular: Non-Contributory  Gastrointestinal: Non-Contributory  Genitourinary: Non-Contributory  Musculoskeletal: Non-Contributory  Neurological: Non-Contributory  Psychiatric: Non-Contributory  Hematology/Lymphatics: Non-Contributory  Endocrine: Non-Contributory  Allergic/Immunologic: Non-Contributory    CURRENT MEDICATIONS:   --------------------------------------------------------------------------------------  Neurologic Medications  gabapentin 300 milliGRAM(s) Oral daily  acetaminophen   Tablet 650 milliGRAM(s) Oral every 6 hours PRN For Temp greater than 38 C (100.4 F)  acetaminophen   Tablet. 650 milliGRAM(s) Oral every 6 hours PRN Mild Pain (1 - 3)  diazepam    Tablet 5 milliGRAM(s) Oral every 8 hours PRN spasm  ondansetron Injectable 4 milliGRAM(s) IV Push once PRN Nausea and/or Vomiting  HYDROmorphone PCA (1 mG/mL) 30 milliLiter(s) PCA Continuous PCA Continuous  ondansetron Injectable 4 milliGRAM(s) IV Push every 6 hours PRN Nausea  HYDROmorphone  Injectable 1 milliGRAM(s) IV Push every 10 minutes PRN Moderate Pain    Respiratory Medications    Cardiovascular Medications  lisinopril 40 milliGRAM(s) Oral daily  ATENolol  Tablet 50 milliGRAM(s) Oral daily    Gastrointestinal Medications  pantoprazole    Tablet 40 milliGRAM(s) Oral before breakfast  calcium carbonate  625 mG + Vitamin D (OsCal 250 + D) 1 Tablet(s) Oral daily  cyanocobalamin 1000 MICROGram(s) Oral daily  sodium chloride 0.9% with potassium chloride 20 mEq/L 1000 milliLiter(s) IV Continuous <Continuous>  docusate sodium 100 milliGRAM(s) Oral three times a day  senna 2 Tablet(s) Oral at bedtime  dextrose 5%. 1000 milliLiter(s) IV Continuous <Continuous>    Genitourinary Medications    Hematologic/Oncologic Medications  enoxaparin Injectable 40 milliGRAM(s) SubCutaneous daily    Antimicrobial/Immunologic Medications  ceFAZolin   IVPB 2000 milliGRAM(s) IV Intermittent every 8 hours    Endocrine/Metabolic Medications  fenofibrate Tablet 145 milliGRAM(s) Oral daily  levothyroxine 200 MICROGram(s) Oral daily  insulin lispro (HumaLOG) corrective regimen sliding scale   SubCutaneous three times a day before meals  insulin lispro (HumaLOG) corrective regimen sliding scale   SubCutaneous at bedtime  dextrose Gel 1 Dose(s) Oral once PRN Blood Glucose LESS THAN 70 milliGRAM(s)/deciliter  dextrose 50% Injectable 12.5 Gram(s) IV Push once  dextrose 50% Injectable 25 Gram(s) IV Push once  dextrose 50% Injectable 25 Gram(s) IV Push once  glucagon  Injectable 1 milliGRAM(s) IntraMuscular once PRN Glucose LESS THAN 70 milligrams/deciliter    Topical/Other Medications  naloxone Injectable 0.1 milliGRAM(s) IV Push every 3 minutes PRN For ANY of the following changes in patient status:  A. RR LESS THAN 10 breaths per minute, B. Oxygen saturation LESS THAN 90%, C. Sedation score of 6    --------------------------------------------------------------------------------------    VITAL SIGNS, INS/OUTS (last 24 hours):  --------------------------------------------------------------------------------------  Vital Signs Last 24 Hrs  T(C): 36.8 (02 Aug 2017 23:05), Max: 36.8 (02 Aug 2017 23:05)  T(F): 98.2 (02 Aug 2017 23:05), Max: 98.2 (02 Aug 2017 23:05)  HR: 66 (03 Aug 2017 01:00) (60 - 80)  BP: 142/69 (03 Aug 2017 01:00) (135/64 - 154/86)  BP(mean): --  RR: 15 (03 Aug 2017 01:00) (12 - 19)  SpO2: 96% (03 Aug 2017 01:00) (95% - 98%)    I&O's Detail    02 Aug 2017 07:01  -  03 Aug 2017 01:44  --------------------------------------------------------  IN:    sodium chloride 0.9% with potassium chloride 20 mEq/L: 300 mL  Total IN: 300 mL    OUT:    Indwelling Catheter - Urethral: 255 mL  Total OUT: 255 mL    Total NET: 45 mL    --------------------------------------------------------------------------------------    EXAM:  General/Neuro  Exam: Normal, NAD, alert, oriented x 3. Lying flat  Able to move hands and feet, sensation intact    Respiratory  Exam: Non-labored breathing    Cardiovascular  Exam: S1, S2.  Regular rate and rhythm.    Normal Sinus Rhythm    GI  Exam: Abdomen soft, Non-tender, Non-distended.    Current Diet: CLD      Tubes/Lines/Drains  ***  [x] Peripheral IV  [] Central Venous Line     	[] R	[] L	[] IJ	[] Fem	[] SC        Type:	    Date Placed:   [] Arterial Line		[] R	[] L	[] Fem	[] Rad	[] Ax	Date Placed:   [] PICC:         	[] Midline		[] Mediport           [] Urinary Catheter		Date Placed:     Extremities  Exam: Extremities warm, pink, well-perfused.      Derm:  Exam: Good skin turgor, no skin breakdown.      :   Exam: Rodriguez catheter in place.     LABS  --------------------------------------------------------------------------------------  CBC Full  -  ( 02 Aug 2017 23:00 )  WBC Count : 14.66 K/uL  Hemoglobin : 12.8 g/dL  Hematocrit : 39.0 %  Platelet Count - Automated : 290 K/uL  Mean Cell Volume : 86.5 fL  Mean Cell Hemoglobin : 28.4 pg  Mean Cell Hemoglobin Concentration : 32.8 %  Auto Neutrophil # : 12.29 K/uL  Auto Lymphocyte # : 1.67 K/uL  Auto Monocyte # : 0.43 K/uL  Auto Eosinophil # : 0.01 K/uL  Auto Basophil # : 0.04 K/uL  Auto Neutrophil % : 83.8 %  Auto Lymphocyte % : 11.4 %  Auto Monocyte % : 2.9 %  Auto Eosinophil % : 0.1 %  Auto Basophil % : 0.3 %    08-02    139  |  101  |  29<H>  ----------------------------<  254<H>  4.8   |  19<L>  |  1.61<H>    Ca    8.9      02 Aug 2017 23:00      --------------------------------------------------------------------------------------

## 2017-08-03 NOTE — CONSULT NOTE ADULT - ASSESSMENT
ASSESSMENT:  67y Female s/p L1 schwannoma s/p laminectomy and fusion of lumbar spine.     PLAN:   Neurologic: Pain control with PCA, q1 neuro checks. Will need to lay flat for 48 hours  Respiratory: Saturating well on room air  Cardiovascular: Hemodynamically stable  Gastrointestinal/Nutrition: Advance diet as tolerated. On CLD currently  Renal/Genitourinary: C/w Rodriguez as patient has to remain flat for 48 hours  Hematologic: VTE ppx to start tomorrow  Infectious Disease: Perioperative Ancef  Lines/Tubes: Hemovac, Rodriguez  Endocrine: ISS  Disposition: PACU for q1 neuro checks

## 2017-08-04 ENCOUNTER — APPOINTMENT (OUTPATIENT)
Dept: MRI IMAGING | Facility: CLINIC | Age: 67
End: 2017-08-04

## 2017-08-04 LAB
BASOPHILS # BLD AUTO: 0.05 K/UL — SIGNIFICANT CHANGE UP (ref 0–0.2)
BASOPHILS NFR BLD AUTO: 0.3 % — SIGNIFICANT CHANGE UP (ref 0–2)
BUN SERPL-MCNC: 28 MG/DL — HIGH (ref 7–23)
CALCIUM SERPL-MCNC: 8.1 MG/DL — LOW (ref 8.4–10.5)
CHLORIDE SERPL-SCNC: 98 MMOL/L — SIGNIFICANT CHANGE UP (ref 98–107)
CO2 SERPL-SCNC: 18 MMOL/L — LOW (ref 22–31)
CREAT SERPL-MCNC: 1.6 MG/DL — HIGH (ref 0.5–1.3)
EOSINOPHIL # BLD AUTO: 0.02 K/UL — SIGNIFICANT CHANGE UP (ref 0–0.5)
EOSINOPHIL NFR BLD AUTO: 0.1 % — SIGNIFICANT CHANGE UP (ref 0–6)
GLUCOSE SERPL-MCNC: 164 MG/DL — HIGH (ref 70–99)
HCT VFR BLD CALC: 34.3 % — LOW (ref 34.5–45)
HGB BLD-MCNC: 10.9 G/DL — LOW (ref 11.5–15.5)
IMM GRANULOCYTES # BLD AUTO: 0.16 # — SIGNIFICANT CHANGE UP
IMM GRANULOCYTES NFR BLD AUTO: 1 % — SIGNIFICANT CHANGE UP (ref 0–1.5)
LYMPHOCYTES # BLD AUTO: 1.86 K/UL — SIGNIFICANT CHANGE UP (ref 1–3.3)
LYMPHOCYTES # BLD AUTO: 11.6 % — LOW (ref 13–44)
MCHC RBC-ENTMCNC: 28.2 PG — SIGNIFICANT CHANGE UP (ref 27–34)
MCHC RBC-ENTMCNC: 31.8 % — LOW (ref 32–36)
MCV RBC AUTO: 88.6 FL — SIGNIFICANT CHANGE UP (ref 80–100)
MONOCYTES # BLD AUTO: 1.59 K/UL — HIGH (ref 0–0.9)
MONOCYTES NFR BLD AUTO: 9.9 % — SIGNIFICANT CHANGE UP (ref 2–14)
NEUTROPHILS # BLD AUTO: 12.4 K/UL — HIGH (ref 1.8–7.4)
NEUTROPHILS NFR BLD AUTO: 77.1 % — HIGH (ref 43–77)
NRBC # FLD: 0 — SIGNIFICANT CHANGE UP
PLATELET # BLD AUTO: 235 K/UL — SIGNIFICANT CHANGE UP (ref 150–400)
PMV BLD: 9.8 FL — SIGNIFICANT CHANGE UP (ref 7–13)
POTASSIUM SERPL-MCNC: SIGNIFICANT CHANGE UP MMOL/L (ref 3.5–5.3)
POTASSIUM SERPL-SCNC: SIGNIFICANT CHANGE UP MMOL/L (ref 3.5–5.3)
RBC # BLD: 3.87 M/UL — SIGNIFICANT CHANGE UP (ref 3.8–5.2)
RBC # FLD: 13.9 % — SIGNIFICANT CHANGE UP (ref 10.3–14.5)
SODIUM SERPL-SCNC: 132 MMOL/L — LOW (ref 135–145)
WBC # BLD: 16.08 K/UL — HIGH (ref 3.8–10.5)
WBC # FLD AUTO: 16.08 K/UL — HIGH (ref 3.8–10.5)

## 2017-08-04 PROCEDURE — 93970 EXTREMITY STUDY: CPT | Mod: 26

## 2017-08-04 RX ORDER — OXYCODONE AND ACETAMINOPHEN 5; 325 MG/1; MG/1
2 TABLET ORAL EVERY 4 HOURS
Qty: 0 | Refills: 0 | Status: DISCONTINUED | OUTPATIENT
Start: 2017-08-04 | End: 2017-08-04

## 2017-08-04 RX ORDER — OXYCODONE AND ACETAMINOPHEN 5; 325 MG/1; MG/1
1 TABLET ORAL EVERY 4 HOURS
Qty: 0 | Refills: 0 | Status: DISCONTINUED | OUTPATIENT
Start: 2017-08-04 | End: 2017-08-10

## 2017-08-04 RX ORDER — OXYCODONE AND ACETAMINOPHEN 5; 325 MG/1; MG/1
2 TABLET ORAL EVERY 6 HOURS
Qty: 0 | Refills: 0 | Status: DISCONTINUED | OUTPATIENT
Start: 2017-08-04 | End: 2017-08-10

## 2017-08-04 RX ORDER — HYDROMORPHONE HYDROCHLORIDE 2 MG/ML
1 INJECTION INTRAMUSCULAR; INTRAVENOUS; SUBCUTANEOUS EVERY 6 HOURS
Qty: 0 | Refills: 0 | Status: DISCONTINUED | OUTPATIENT
Start: 2017-08-04 | End: 2017-08-10

## 2017-08-04 RX ADMIN — Medication 100 MILLIGRAM(S): at 05:52

## 2017-08-04 RX ADMIN — OXYCODONE AND ACETAMINOPHEN 2 TABLET(S): 5; 325 TABLET ORAL at 21:01

## 2017-08-04 RX ADMIN — Medication 100 MILLIGRAM(S): at 14:21

## 2017-08-04 RX ADMIN — Medication 650 MILLIGRAM(S): at 13:43

## 2017-08-04 RX ADMIN — GABAPENTIN 200 MILLIGRAM(S): 400 CAPSULE ORAL at 05:51

## 2017-08-04 RX ADMIN — GABAPENTIN 200 MILLIGRAM(S): 400 CAPSULE ORAL at 21:17

## 2017-08-04 RX ADMIN — Medication 100 MILLIGRAM(S): at 21:18

## 2017-08-04 RX ADMIN — TIZANIDINE 2 MILLIGRAM(S): 4 TABLET ORAL at 22:19

## 2017-08-04 RX ADMIN — OXYCODONE AND ACETAMINOPHEN 2 TABLET(S): 5; 325 TABLET ORAL at 19:42

## 2017-08-04 RX ADMIN — ATENOLOL 50 MILLIGRAM(S): 25 TABLET ORAL at 05:51

## 2017-08-04 RX ADMIN — HYDROMORPHONE HYDROCHLORIDE 1 MILLIGRAM(S): 2 INJECTION INTRAMUSCULAR; INTRAVENOUS; SUBCUTANEOUS at 23:00

## 2017-08-04 RX ADMIN — Medication 650 MILLIGRAM(S): at 00:35

## 2017-08-04 RX ADMIN — Medication 650 MILLIGRAM(S): at 00:34

## 2017-08-04 RX ADMIN — GABAPENTIN 200 MILLIGRAM(S): 400 CAPSULE ORAL at 14:21

## 2017-08-04 RX ADMIN — Medication 4: at 13:11

## 2017-08-04 RX ADMIN — Medication 2: at 09:16

## 2017-08-04 RX ADMIN — TIZANIDINE 2 MILLIGRAM(S): 4 TABLET ORAL at 14:21

## 2017-08-04 RX ADMIN — ENOXAPARIN SODIUM 40 MILLIGRAM(S): 100 INJECTION SUBCUTANEOUS at 13:14

## 2017-08-04 RX ADMIN — SENNA PLUS 2 TABLET(S): 8.6 TABLET ORAL at 21:18

## 2017-08-04 RX ADMIN — Medication 200 MICROGRAM(S): at 05:52

## 2017-08-04 RX ADMIN — Medication 650 MILLIGRAM(S): at 07:49

## 2017-08-04 RX ADMIN — PANTOPRAZOLE SODIUM 40 MILLIGRAM(S): 20 TABLET, DELAYED RELEASE ORAL at 07:49

## 2017-08-04 RX ADMIN — Medication 2: at 18:32

## 2017-08-04 RX ADMIN — HYDROMORPHONE HYDROCHLORIDE 30 MILLILITER(S): 2 INJECTION INTRAMUSCULAR; INTRAVENOUS; SUBCUTANEOUS at 08:22

## 2017-08-04 RX ADMIN — Medication 650 MILLIGRAM(S): at 13:12

## 2017-08-04 RX ADMIN — Medication 650 MILLIGRAM(S): at 08:24

## 2017-08-04 RX ADMIN — PREGABALIN 1000 MICROGRAM(S): 225 CAPSULE ORAL at 13:11

## 2017-08-04 RX ADMIN — Medication 145 MILLIGRAM(S): at 13:12

## 2017-08-04 RX ADMIN — LISINOPRIL 40 MILLIGRAM(S): 2.5 TABLET ORAL at 05:52

## 2017-08-04 RX ADMIN — TIZANIDINE 2 MILLIGRAM(S): 4 TABLET ORAL at 05:52

## 2017-08-04 RX ADMIN — HYDROMORPHONE HYDROCHLORIDE 1 MILLIGRAM(S): 2 INJECTION INTRAMUSCULAR; INTRAVENOUS; SUBCUTANEOUS at 22:19

## 2017-08-05 DIAGNOSIS — R50.9 FEVER, UNSPECIFIED: ICD-10-CM

## 2017-08-05 LAB
BASOPHILS # BLD AUTO: 0.04 K/UL — SIGNIFICANT CHANGE UP (ref 0–0.2)
BASOPHILS NFR BLD AUTO: 0.3 % — SIGNIFICANT CHANGE UP (ref 0–2)
BUN SERPL-MCNC: 24 MG/DL — HIGH (ref 7–23)
BUN SERPL-MCNC: 27 MG/DL — HIGH (ref 7–23)
CALCIUM SERPL-MCNC: 8.3 MG/DL — LOW (ref 8.4–10.5)
CALCIUM SERPL-MCNC: 8.6 MG/DL — SIGNIFICANT CHANGE UP (ref 8.4–10.5)
CHLORIDE SERPL-SCNC: 96 MMOL/L — LOW (ref 98–107)
CHLORIDE SERPL-SCNC: 99 MMOL/L — SIGNIFICANT CHANGE UP (ref 98–107)
CO2 SERPL-SCNC: 26 MMOL/L — SIGNIFICANT CHANGE UP (ref 22–31)
CO2 SERPL-SCNC: 27 MMOL/L — SIGNIFICANT CHANGE UP (ref 22–31)
CREAT SERPL-MCNC: 1.31 MG/DL — HIGH (ref 0.5–1.3)
CREAT SERPL-MCNC: 1.43 MG/DL — HIGH (ref 0.5–1.3)
EOSINOPHIL # BLD AUTO: 0.01 K/UL — SIGNIFICANT CHANGE UP (ref 0–0.5)
EOSINOPHIL NFR BLD AUTO: 0.1 % — SIGNIFICANT CHANGE UP (ref 0–6)
GLUCOSE SERPL-MCNC: 197 MG/DL — HIGH (ref 70–99)
GLUCOSE SERPL-MCNC: 220 MG/DL — HIGH (ref 70–99)
HCT VFR BLD CALC: 32.7 % — LOW (ref 34.5–45)
HCT VFR BLD CALC: 32.8 % — LOW (ref 34.5–45)
HGB BLD-MCNC: 10.6 G/DL — LOW (ref 11.5–15.5)
HGB BLD-MCNC: 10.9 G/DL — LOW (ref 11.5–15.5)
IMM GRANULOCYTES # BLD AUTO: 0.22 # — SIGNIFICANT CHANGE UP
IMM GRANULOCYTES NFR BLD AUTO: 1.4 % — SIGNIFICANT CHANGE UP (ref 0–1.5)
LYMPHOCYTES # BLD AUTO: 1.6 K/UL — SIGNIFICANT CHANGE UP (ref 1–3.3)
LYMPHOCYTES # BLD AUTO: 10.2 % — LOW (ref 13–44)
MCHC RBC-ENTMCNC: 28.5 PG — SIGNIFICANT CHANGE UP (ref 27–34)
MCHC RBC-ENTMCNC: 28.7 PG — SIGNIFICANT CHANGE UP (ref 27–34)
MCHC RBC-ENTMCNC: 32.4 % — SIGNIFICANT CHANGE UP (ref 32–36)
MCHC RBC-ENTMCNC: 33.2 % — SIGNIFICANT CHANGE UP (ref 32–36)
MCV RBC AUTO: 85.9 FL — SIGNIFICANT CHANGE UP (ref 80–100)
MCV RBC AUTO: 88.6 FL — SIGNIFICANT CHANGE UP (ref 80–100)
MONOCYTES # BLD AUTO: 1.1 K/UL — HIGH (ref 0–0.9)
MONOCYTES NFR BLD AUTO: 7 % — SIGNIFICANT CHANGE UP (ref 2–14)
NEUTROPHILS # BLD AUTO: 12.64 K/UL — HIGH (ref 1.8–7.4)
NEUTROPHILS NFR BLD AUTO: 81 % — HIGH (ref 43–77)
NRBC # FLD: 0 — SIGNIFICANT CHANGE UP
NRBC # FLD: 0 — SIGNIFICANT CHANGE UP
PLATELET # BLD AUTO: 207 K/UL — SIGNIFICANT CHANGE UP (ref 150–400)
PLATELET # BLD AUTO: 251 K/UL — SIGNIFICANT CHANGE UP (ref 150–400)
PMV BLD: 10.4 FL — SIGNIFICANT CHANGE UP (ref 7–13)
PMV BLD: 10.4 FL — SIGNIFICANT CHANGE UP (ref 7–13)
POTASSIUM SERPL-MCNC: 4.2 MMOL/L — SIGNIFICANT CHANGE UP (ref 3.5–5.3)
POTASSIUM SERPL-MCNC: 4.8 MMOL/L — SIGNIFICANT CHANGE UP (ref 3.5–5.3)
POTASSIUM SERPL-SCNC: 4.2 MMOL/L — SIGNIFICANT CHANGE UP (ref 3.5–5.3)
POTASSIUM SERPL-SCNC: 4.8 MMOL/L — SIGNIFICANT CHANGE UP (ref 3.5–5.3)
RBC # BLD: 3.69 M/UL — LOW (ref 3.8–5.2)
RBC # BLD: 3.82 M/UL — SIGNIFICANT CHANGE UP (ref 3.8–5.2)
RBC # FLD: 13.4 % — SIGNIFICANT CHANGE UP (ref 10.3–14.5)
RBC # FLD: 13.6 % — SIGNIFICANT CHANGE UP (ref 10.3–14.5)
SODIUM SERPL-SCNC: 135 MMOL/L — SIGNIFICANT CHANGE UP (ref 135–145)
SODIUM SERPL-SCNC: 139 MMOL/L — SIGNIFICANT CHANGE UP (ref 135–145)
WBC # BLD: 15.5 K/UL — HIGH (ref 3.8–10.5)
WBC # BLD: 15.61 K/UL — HIGH (ref 3.8–10.5)
WBC # FLD AUTO: 15.5 K/UL — HIGH (ref 3.8–10.5)
WBC # FLD AUTO: 15.61 K/UL — HIGH (ref 3.8–10.5)

## 2017-08-05 PROCEDURE — 71010: CPT | Mod: 26,76

## 2017-08-05 RX ORDER — DIAZEPAM 5 MG
5 TABLET ORAL EVERY 6 HOURS
Qty: 0 | Refills: 0 | Status: DISCONTINUED | OUTPATIENT
Start: 2017-08-05 | End: 2017-08-10

## 2017-08-05 RX ORDER — POLYETHYLENE GLYCOL 3350 17 G/17G
17 POWDER, FOR SOLUTION ORAL EVERY 12 HOURS
Qty: 0 | Refills: 0 | Status: DISCONTINUED | OUTPATIENT
Start: 2017-08-05 | End: 2017-08-10

## 2017-08-05 RX ADMIN — ATENOLOL 50 MILLIGRAM(S): 25 TABLET ORAL at 05:41

## 2017-08-05 RX ADMIN — Medication 100 MILLIGRAM(S): at 21:56

## 2017-08-05 RX ADMIN — HYDROMORPHONE HYDROCHLORIDE 1 MILLIGRAM(S): 2 INJECTION INTRAMUSCULAR; INTRAVENOUS; SUBCUTANEOUS at 05:41

## 2017-08-05 RX ADMIN — SENNA PLUS 2 TABLET(S): 8.6 TABLET ORAL at 21:56

## 2017-08-05 RX ADMIN — GABAPENTIN 200 MILLIGRAM(S): 400 CAPSULE ORAL at 05:42

## 2017-08-05 RX ADMIN — LISINOPRIL 40 MILLIGRAM(S): 2.5 TABLET ORAL at 05:41

## 2017-08-05 RX ADMIN — Medication 100 MILLIGRAM(S): at 13:00

## 2017-08-05 RX ADMIN — PREGABALIN 1000 MICROGRAM(S): 225 CAPSULE ORAL at 12:50

## 2017-08-05 RX ADMIN — PANTOPRAZOLE SODIUM 40 MILLIGRAM(S): 20 TABLET, DELAYED RELEASE ORAL at 06:06

## 2017-08-05 RX ADMIN — GABAPENTIN 200 MILLIGRAM(S): 400 CAPSULE ORAL at 13:00

## 2017-08-05 RX ADMIN — OXYCODONE AND ACETAMINOPHEN 2 TABLET(S): 5; 325 TABLET ORAL at 02:22

## 2017-08-05 RX ADMIN — Medication 6: at 17:50

## 2017-08-05 RX ADMIN — Medication 2: at 09:12

## 2017-08-05 RX ADMIN — Medication 145 MILLIGRAM(S): at 12:50

## 2017-08-05 RX ADMIN — Medication 100 MILLIGRAM(S): at 05:42

## 2017-08-05 RX ADMIN — HYDROMORPHONE HYDROCHLORIDE 1 MILLIGRAM(S): 2 INJECTION INTRAMUSCULAR; INTRAVENOUS; SUBCUTANEOUS at 15:37

## 2017-08-05 RX ADMIN — Medication 200 MICROGRAM(S): at 05:42

## 2017-08-05 RX ADMIN — Medication 650 MILLIGRAM(S): at 19:27

## 2017-08-05 RX ADMIN — Medication 5: at 13:32

## 2017-08-05 RX ADMIN — OXYCODONE AND ACETAMINOPHEN 2 TABLET(S): 5; 325 TABLET ORAL at 21:53

## 2017-08-05 RX ADMIN — OXYCODONE AND ACETAMINOPHEN 2 TABLET(S): 5; 325 TABLET ORAL at 22:30

## 2017-08-05 RX ADMIN — ENOXAPARIN SODIUM 40 MILLIGRAM(S): 100 INJECTION SUBCUTANEOUS at 12:59

## 2017-08-05 RX ADMIN — OXYCODONE AND ACETAMINOPHEN 2 TABLET(S): 5; 325 TABLET ORAL at 09:41

## 2017-08-05 RX ADMIN — TIZANIDINE 2 MILLIGRAM(S): 4 TABLET ORAL at 05:41

## 2017-08-05 RX ADMIN — HYDROMORPHONE HYDROCHLORIDE 1 MILLIGRAM(S): 2 INJECTION INTRAMUSCULAR; INTRAVENOUS; SUBCUTANEOUS at 16:07

## 2017-08-05 RX ADMIN — HYDROMORPHONE HYDROCHLORIDE 1 MILLIGRAM(S): 2 INJECTION INTRAMUSCULAR; INTRAVENOUS; SUBCUTANEOUS at 06:14

## 2017-08-05 RX ADMIN — TIZANIDINE 2 MILLIGRAM(S): 4 TABLET ORAL at 21:54

## 2017-08-05 RX ADMIN — OXYCODONE AND ACETAMINOPHEN 2 TABLET(S): 5; 325 TABLET ORAL at 01:58

## 2017-08-05 RX ADMIN — TIZANIDINE 2 MILLIGRAM(S): 4 TABLET ORAL at 13:00

## 2017-08-05 RX ADMIN — GABAPENTIN 200 MILLIGRAM(S): 400 CAPSULE ORAL at 21:56

## 2017-08-05 RX ADMIN — OXYCODONE AND ACETAMINOPHEN 2 TABLET(S): 5; 325 TABLET ORAL at 09:11

## 2017-08-05 NOTE — CONSULT NOTE ADULT - ASSESSMENT
68 y/o female w/ hx of DM, HTN, HLD, arthritis, PUD and spinal stenosis admitted for lumbar laminectomy T12- L2 for resection of intradural tumor (8/2/17) w/ post op course c/b fever and cough

## 2017-08-05 NOTE — CONSULT NOTE ADULT - SUBJECTIVE AND OBJECTIVE BOX
CHIEF COMPLAINT: s/p lumbar laminectomy T12- L2 for resection of intradural tumor    HPI:    66 y/o female w/ hx of DM, HTN, HLD, arthritis, PUD and spinal stenosis admitted for lumbar laminectomy T12- L2 for resection of intradural tumor (8/2/17).   Patient had posterior and posterolateral instrumented spinal fusion as well as T12 to L2 laminectomies for resection of a schwannoma on 8/2/17. She received 3 doses of Ancef post-op and was on bedrest w/ head of bed flat x 24 hours following the procedure. This afternoon, patient was noted to be febrile to 100.8. She endorses a non-productive cough and denies any abdominal pain, nausea/vomiting, chest pain, shortness of breath and dysuria. Patient reports that her back pain is currently well-controlled.     PAST MEDICAL & SURGICAL HISTORY:  Shingles: 2016  Intradural tumor  Spinal stenosis  Malignant neoplasm thyroid: tx with radioactive iodiene 2012  Vitamin B 12 deficiency  Arthritis  Neuropathy  Hypertension  Diabetes mellitus: type 2  Hyperlipidemia  Stomach ulcer: 1980  Carpal tunnel syndrome: 2015  S/P thyroidectomy: 2012  Kidney stones: 1990  lipoma removed: 9/2011 left side of neck  laminectomy: 2001  cholecystectomy: 1980  tonsillectomy: 1968  appendectomy: 1967      FAMILY HISTORY:  Lung cancer (father)  Breast cancer (sister) 	       SOCIAL HISTORY:    Never smoker   Denies alcohol use     Allergies  azithromycin (Other)    REVIEW OF SYSTEMS:  Constitutional: + fever, + malaise   Eyes: no vision changes or vision loss   ENT: no throat pain, sinus symptoms or dysphagia    CV: no chest pain, palpitations or leg edema   Resp: +dry cough, no shortness of breath or hemoptysis   GI: no abdominal pain, nausea/vomiting or diarrhea   : no hematuria or dysuria   MSK: no leg pain   Integumentary: no rashes  Neurological: no headaches or seizures     OBJECTIVE:    T(C): 37.3 (05 Aug 2017 21:52), Max: 38.2 (05 Aug 2017 17:33)  T(F): 99.1 (05 Aug 2017 21:52), Max: 100.8 (05 Aug 2017 17:33)  HR: 72 (05 Aug 2017 21:52) (72 - 78)  BP: 141/60 (05 Aug 2017 21:52) (119/57 - 142/58)  RR: 18 (05 Aug 2017 21:52) (16 - 18)  SpO2: 93% (05 Aug 2017 21:52) (93% - 97%)        08-04 @ 07:01 - 08-05 @ 07:00  --------------------------------------------------------  IN: 1800 mL / OUT: 2725 mL / NET: -925 mL    08-05 @ 07:01 - 08-05 @ 23:37  --------------------------------------------------------  IN: 0 mL / OUT: 1450 mL / NET: -1450 mL      CAPILLARY BLOOD GLUCOSE  233 (05 Aug 2017 21:52)    PHYSICAL EXAM:  General: NAD, resting comfortably   HEENT: EOMI, WILNER, normal oral mucosa  Lymph Nodes: no cervical LAD   Neck: normal, supple   Respiratory: exam limited 2/2 poor inspiratory effort; mild crackles R side   Cardiovascular: S1, S2, RRR  Abdomen: soft, non-tender, non-distended  Extremities: no lower extremity edema   Skin: no rashes, dressing intact over lower back. no erythema at surgical site   Neurological: AO x 3, no focal deficits, sensation intact to light tough throughout   Psychiatry: appropriate mood and affect       HOSPITAL MEDICATIONS:  MEDICATIONS  (STANDING):  lisinopril 40 milliGRAM(s) Oral daily  ATENolol  Tablet 50 milliGRAM(s) Oral daily  fenofibrate Tablet 145 milliGRAM(s) Oral daily  pantoprazole    Tablet 40 milliGRAM(s) Oral before breakfast  levothyroxine 200 MICROGram(s) Oral daily  cyanocobalamin 1000 MICROGram(s) Oral daily  docusate sodium 100 milliGRAM(s) Oral three times a day  senna 2 Tablet(s) Oral at bedtime  insulin lispro (HumaLOG) corrective regimen sliding scale   SubCutaneous three times a day before meals  insulin lispro (HumaLOG) corrective regimen sliding scale   SubCutaneous at bedtime  dextrose 50% Injectable 12.5 Gram(s) IV Push once  dextrose 50% Injectable 25 Gram(s) IV Push once  dextrose 50% Injectable 25 Gram(s) IV Push once  enoxaparin Injectable 40 milliGRAM(s) SubCutaneous daily  gabapentin 200 milliGRAM(s) Oral every 8 hours  tiZANidine 2 milliGRAM(s) Oral every 8 hours  polyethylene glycol 3350 17 Gram(s) Oral every 12 hours    MEDICATIONS  (PRN):  acetaminophen   Tablet 650 milliGRAM(s) Oral every 6 hours PRN For Temp greater than 38 C (100.4 F)  dextrose Gel 1 Dose(s) Oral once PRN Blood Glucose LESS THAN 70 milliGRAM(s)/deciliter  glucagon  Injectable 1 milliGRAM(s) IntraMuscular once PRN Glucose LESS THAN 70 milligrams/deciliter  oxyCODONE    5 mG/acetaminophen 325 mG 2 Tablet(s) Oral every 6 hours PRN Moderate Pain (4 - 6)  oxyCODONE    5 mG/acetaminophen 325 mG 1 Tablet(s) Oral every 4 hours PRN Mild Pain (1 - 3)  HYDROmorphone   Tablet 1 milliGRAM(s) Oral every 6 hours PRN Severe Pain (7 - 10)  diazepam    Tablet 5 milliGRAM(s) Oral every 6 hours PRN muscle spasm      LABS:                        10.9   15.50 )-----------( 251      ( 05 Aug 2017 19:35 )             32.8     08-05    135  |  96<L>  |  27<H>  ----------------------------<  220<H>  4.2   |  26  |  1.31<H>    Ca    8.6      05 Aug 2017 19:35

## 2017-08-06 DIAGNOSIS — I10 ESSENTIAL (PRIMARY) HYPERTENSION: ICD-10-CM

## 2017-08-06 DIAGNOSIS — E89.0 POSTPROCEDURAL HYPOTHYROIDISM: ICD-10-CM

## 2017-08-06 DIAGNOSIS — E11.65 TYPE 2 DIABETES MELLITUS WITH HYPERGLYCEMIA: ICD-10-CM

## 2017-08-06 LAB
HBA1C BLD-MCNC: 6.2 % — HIGH (ref 4–5.6)
SPECIMEN SOURCE: SIGNIFICANT CHANGE UP
SPECIMEN SOURCE: SIGNIFICANT CHANGE UP

## 2017-08-06 PROCEDURE — 99223 1ST HOSP IP/OBS HIGH 75: CPT | Mod: GC

## 2017-08-06 PROCEDURE — 99255 IP/OBS CONSLTJ NEW/EST HI 80: CPT

## 2017-08-06 RX ORDER — PIPERACILLIN AND TAZOBACTAM 4; .5 G/20ML; G/20ML
3.38 INJECTION, POWDER, LYOPHILIZED, FOR SOLUTION INTRAVENOUS EVERY 8 HOURS
Qty: 0 | Refills: 0 | Status: DISCONTINUED | OUTPATIENT
Start: 2017-08-06 | End: 2017-08-09

## 2017-08-06 RX ORDER — VANCOMYCIN HCL 1 G
VIAL (EA) INTRAVENOUS
Qty: 0 | Refills: 0 | Status: DISCONTINUED | OUTPATIENT
Start: 2017-08-06 | End: 2017-08-09

## 2017-08-06 RX ORDER — INSULIN GLARGINE 100 [IU]/ML
24 INJECTION, SOLUTION SUBCUTANEOUS AT BEDTIME
Qty: 0 | Refills: 0 | Status: DISCONTINUED | OUTPATIENT
Start: 2017-08-06 | End: 2017-08-07

## 2017-08-06 RX ORDER — INSULIN LISPRO 100/ML
6 VIAL (ML) SUBCUTANEOUS
Qty: 0 | Refills: 0 | Status: DISCONTINUED | OUTPATIENT
Start: 2017-08-06 | End: 2017-08-07

## 2017-08-06 RX ORDER — VANCOMYCIN HCL 1 G
1000 VIAL (EA) INTRAVENOUS ONCE
Qty: 0 | Refills: 0 | Status: COMPLETED | OUTPATIENT
Start: 2017-08-06 | End: 2017-08-06

## 2017-08-06 RX ORDER — KETOCONAZOLE 20 MG/G
1 AEROSOL, FOAM TOPICAL DAILY
Qty: 0 | Refills: 0 | Status: DISCONTINUED | OUTPATIENT
Start: 2017-08-06 | End: 2017-08-10

## 2017-08-06 RX ORDER — VANCOMYCIN HCL 1 G
1000 VIAL (EA) INTRAVENOUS EVERY 12 HOURS
Qty: 0 | Refills: 0 | Status: DISCONTINUED | OUTPATIENT
Start: 2017-08-06 | End: 2017-08-09

## 2017-08-06 RX ADMIN — Medication 145 MILLIGRAM(S): at 11:37

## 2017-08-06 RX ADMIN — GABAPENTIN 200 MILLIGRAM(S): 400 CAPSULE ORAL at 21:17

## 2017-08-06 RX ADMIN — ENOXAPARIN SODIUM 40 MILLIGRAM(S): 100 INJECTION SUBCUTANEOUS at 11:36

## 2017-08-06 RX ADMIN — TIZANIDINE 2 MILLIGRAM(S): 4 TABLET ORAL at 05:36

## 2017-08-06 RX ADMIN — Medication 6 UNIT(S): at 18:04

## 2017-08-06 RX ADMIN — PIPERACILLIN AND TAZOBACTAM 25 GRAM(S): 4; .5 INJECTION, POWDER, LYOPHILIZED, FOR SOLUTION INTRAVENOUS at 21:21

## 2017-08-06 RX ADMIN — LISINOPRIL 40 MILLIGRAM(S): 2.5 TABLET ORAL at 05:36

## 2017-08-06 RX ADMIN — Medication 6: at 12:58

## 2017-08-06 RX ADMIN — Medication 250 MILLIGRAM(S): at 02:40

## 2017-08-06 RX ADMIN — PREGABALIN 1000 MICROGRAM(S): 225 CAPSULE ORAL at 11:36

## 2017-08-06 RX ADMIN — GABAPENTIN 200 MILLIGRAM(S): 400 CAPSULE ORAL at 05:36

## 2017-08-06 RX ADMIN — Medication 6: at 09:04

## 2017-08-06 RX ADMIN — SENNA PLUS 2 TABLET(S): 8.6 TABLET ORAL at 21:17

## 2017-08-06 RX ADMIN — Medication 100 MILLIGRAM(S): at 05:36

## 2017-08-06 RX ADMIN — Medication 100 MILLIGRAM(S): at 13:27

## 2017-08-06 RX ADMIN — Medication 100 MILLIGRAM(S): at 21:17

## 2017-08-06 RX ADMIN — GABAPENTIN 200 MILLIGRAM(S): 400 CAPSULE ORAL at 13:27

## 2017-08-06 RX ADMIN — Medication 6: at 18:04

## 2017-08-06 RX ADMIN — OXYCODONE AND ACETAMINOPHEN 2 TABLET(S): 5; 325 TABLET ORAL at 23:34

## 2017-08-06 RX ADMIN — PANTOPRAZOLE SODIUM 40 MILLIGRAM(S): 20 TABLET, DELAYED RELEASE ORAL at 06:05

## 2017-08-06 RX ADMIN — INSULIN GLARGINE 24 UNIT(S): 100 INJECTION, SOLUTION SUBCUTANEOUS at 21:21

## 2017-08-06 RX ADMIN — Medication 250 MILLIGRAM(S): at 18:27

## 2017-08-06 RX ADMIN — Medication 200 MICROGRAM(S): at 05:36

## 2017-08-06 RX ADMIN — KETOCONAZOLE 1 APPLICATION(S): 20 AEROSOL, FOAM TOPICAL at 11:36

## 2017-08-06 RX ADMIN — PIPERACILLIN AND TAZOBACTAM 25 GRAM(S): 4; .5 INJECTION, POWDER, LYOPHILIZED, FOR SOLUTION INTRAVENOUS at 13:27

## 2017-08-06 RX ADMIN — PIPERACILLIN AND TAZOBACTAM 25 GRAM(S): 4; .5 INJECTION, POWDER, LYOPHILIZED, FOR SOLUTION INTRAVENOUS at 05:35

## 2017-08-06 RX ADMIN — Medication 1: at 21:21

## 2017-08-06 RX ADMIN — ATENOLOL 50 MILLIGRAM(S): 25 TABLET ORAL at 05:36

## 2017-08-06 NOTE — PHYSICAL THERAPY INITIAL EVALUATION ADULT - PHYSICAL ASSIST/NONPHYSICAL ASSIST: SIT/STAND, REHAB EVAL
I will START or STAY ON the medications listed below when I get home from the hospital:    acetaminophen-oxyCODONE 325 mg-5 mg oral tablet  -- 1 tab(s) by mouth every 4 hours, As needed, Moderate Pain  -- Indication: For Postoperative pain    acetaminophen-oxyCODONE 325 mg-5 mg oral tablet  -- 1 tab(s) by mouth every 6 hours, As Needed, Severe Pain -for shortness of breath and/or wheezing MDD:6  -- Indication: For Postoperative pain    metoprolol tartrate 25 mg oral tablet  -- 1 tab(s) by mouth once a day  -- Indication: For HTN    Pepcid 20 mg oral tablet  -- 1 tab(s) by mouth once (at bedtime) night before procedure and morning of procedure  -- Indication: For GERD 1 person assist

## 2017-08-06 NOTE — CONSULT NOTE ADULT - PROBLEM SELECTOR RECOMMENDATION 9
Type 2 DM- Hyperglycemia. Will start basal bolus regimen. Given decreased p.o. intake, will start Humalog 6 units QAC and Lantus 24 units QHS. Will continue moderate Humalog correction scale. Will monitor and adjust regimen. Upon discharge will remain on basal bolus regimen with dosages to be determined. Will follow up with her outpatient endocrinologist.

## 2017-08-06 NOTE — CONSULT NOTE ADULT - ASSESSMENT
67 y.o. female with h/o Type 2 DM, HTN, hypothyroidism s/p surgery and I 131 for thyroid cancer s/p intradural tumor resection and T12-L2 fusion

## 2017-08-06 NOTE — CONSULT NOTE ADULT - PROBLEM SELECTOR RECOMMENDATION 3
Hypothyroidism s/p surgery and I 131 for thyroid cancer- Will continue LT4 200 mcg daily. Appears clinically euthyroid. Will follow up with outpatient endocrinologist for TFTs and Tg level including thyroid cancer surveillance.

## 2017-08-06 NOTE — CONSULT NOTE ADULT - SUBJECTIVE AND OBJECTIVE BOX
HPI:  66y/o female with h/o Type 2 DM insulin requiring, HT, hyperlipidemia, hypothyroidism s/p surgery for thyroid cancer s/p resection of intradural tumor with T12-L2 fusion on 2017.  Currently c/o constipation and low back pain. Also reports pain in right foot. No nausea or vomiting. No SOB or CP. No polyuria and no polydipsia. Reports minimal p.o. intake now. At home regarding DM, takes Toujeo 72 units QHS and Novolog 28 units before breakfast, 30 units before lunch and 34 to 36 units before dinner. Checks FS 4 times per day and reports outpatient Hba1c is 6.5%. Follows with Dr. Fritz endocrinology every 3 months. Denies complications. Follows with optho yearly and no retinopathy. No kidney disease. No foot complaints until now.     Also has h/o thyroid cancer s/p surgery and I 131 in . Takes LT4 200 mcg daily. No neck complaints. Sees Dr. Hawkins also for follow up along with her endocrinologist.         PAST MEDICAL & SURGICAL HISTORY:  Obese  Shingles: 2016  Intradural tumor  Spinal stenosis  Malignant neoplasm thyroid: tx with radioactive iodiene   Vitamin B 12 deficiency  Arthritis  Neuropathy  Hypertension  Diabetes mellitus: type 2  Hyperlipidemia  Stomach ulcer:   Carpal tunnel syndrome:   S/P thyroidectomy:   Kidney stones:   lipoma removed: 2011 left side of neck  laminectomy:   cholecystectomy:   tonsillectomy:   appendectomy:       FAMILY HISTORY: Father  from lung cancer, Brother has Type 2 DM, Cousin with goiter      Social History: No smoking and no ETOH use    Outpatient Medications: Toujeo 72 units daily, Novolog 28,30,34 to 36 QAC, LT4 200 mcg daily    MEDICATIONS  (STANDING):  lisinopril 40 milliGRAM(s) Oral daily  ATENolol  Tablet 50 milliGRAM(s) Oral daily  fenofibrate Tablet 145 milliGRAM(s) Oral daily  pantoprazole    Tablet 40 milliGRAM(s) Oral before breakfast  levothyroxine 200 MICROGram(s) Oral daily  cyanocobalamin 1000 MICROGram(s) Oral daily  docusate sodium 100 milliGRAM(s) Oral three times a day  senna 2 Tablet(s) Oral at bedtime  insulin lispro (HumaLOG) corrective regimen sliding scale   SubCutaneous three times a day before meals  insulin lispro (HumaLOG) corrective regimen sliding scale   SubCutaneous at bedtime  dextrose 50% Injectable 12.5 Gram(s) IV Push once  dextrose 50% Injectable 25 Gram(s) IV Push once  dextrose 50% Injectable 25 Gram(s) IV Push once  enoxaparin Injectable 40 milliGRAM(s) SubCutaneous daily  gabapentin 200 milliGRAM(s) Oral every 8 hours  polyethylene glycol 3350 17 Gram(s) Oral every 12 hours  vancomycin  IVPB 1000 milliGRAM(s) IV Intermittent every 12 hours  vancomycin  IVPB   IV Intermittent   piperacillin/tazobactam IVPB. 3.375 Gram(s) IV Intermittent every 8 hours  ketoconazole 2% Cream 1 Application(s) Topical daily    MEDICATIONS  (PRN):  acetaminophen   Tablet 650 milliGRAM(s) Oral every 6 hours PRN For Temp greater than 38 C (100.4 F)  dextrose Gel 1 Dose(s) Oral once PRN Blood Glucose LESS THAN 70 milliGRAM(s)/deciliter  glucagon  Injectable 1 milliGRAM(s) IntraMuscular once PRN Glucose LESS THAN 70 milligrams/deciliter  oxyCODONE    5 mG/acetaminophen 325 mG 2 Tablet(s) Oral every 6 hours PRN Moderate Pain (4 - 6)  oxyCODONE    5 mG/acetaminophen 325 mG 1 Tablet(s) Oral every 4 hours PRN Mild Pain (1 - 3)  HYDROmorphone   Tablet 1 milliGRAM(s) Oral every 6 hours PRN Severe Pain (7 - 10)  diazepam    Tablet 5 milliGRAM(s) Oral every 6 hours PRN muscle spasm      Allergies    azithromycin (Other)    Intolerances      Review of Systems:  Constitutional: No fever  Eyes: No blurry vision  Neuro: No tremors, right foot pain  HEENT: No pain  Cardiovascular: No chest pain, palpitations  Respiratory: No SOB, no cough  GI: No nausea, vomiting, abdominal pain, reports constipation  : No dysuria  Skin: no rash  Psych: no depression  Endocrine: no polyuria, polydipsia  Hem/lymph: no swelling      ALL OTHER SYSTEMS REVIEWED AND NEGATIVE      PHYSICAL EXAM:  VITALS: T(C): 37.4 (17 @ 10:40)  T(F): 99.3 (17 @ 10:40), Max: 100.8 (17 @ 17:33)  HR: 69 (17 @ 10:40) (65 - 78)  BP: 130/60 (17 @ 10:40) (130/60 - 148/66)  RR:  (16 - 18)  SpO2:  (93% - 96%)  Wt(kg): --  GENERAL: NAD, well-groomed, well-developed  EYES: No proptosis, no lid lag, anicteric  HEENT:  Atraumatic, Normocephalic, moist mucous membranes  NECK: no masses noted, well healed scar  RESPIRATORY: Clear to auscultation bilaterally; No rales, rhonchi, wheezing  CARDIOVASCULAR: Regular rate and rhythm; No murmurs; no peripheral edema  GI: Soft, nontender, non distended, normal bowel sounds  SKIN: Dry, intact, low back bandaged  MUSCULOSKELETAL: Full range of motion, normal strength  NEURO: extraocular movements intact, no tremor  PSYCH: Alert and oriented x 3, normal affect, normal mood  CUSHING'S SIGNS: no striae    CAPILLARY BLOOD GLUCOSE  260 ( @ 09:14)  233 ( @ 21:52)  270 ( @ 16:51)  255 ( @ 12:43)  169 ( @ 08:59)  181 ( @ 18:15)  206 ( @ 13:00)  182 ( @ 07:35)  188 ( @ 21:47)  199 ( @ 16:00)                            10.9   15.50 )-----------( 251      ( 05 Aug 2017 19:35 )             32.8           135  |  96<L>  |  27<H>  ----------------------------<  220<H>  4.2   |  26  |  1.31<H>    EGFR if : 49  EGFR if non : 42    Ca    8.6              Thyroid Function Tests:      Hemoglobin A1C, Whole Blood: 6.2 % <H> [4.0 - 5.6] (17 @ 08:15)  Hemoglobin A1C, Whole Blood: 6.0 % <H> [4.0 - 5.6] (17 @ 10:15)          Radiology:

## 2017-08-06 NOTE — PHYSICAL THERAPY INITIAL EVALUATION ADULT - GENERAL OBSERVATIONS, REHAB EVAL
Received sitting at the edge of the bed and left sitting up in chair on bedpan with call bell in reach on the right and PCA present.

## 2017-08-07 ENCOUNTER — APPOINTMENT (OUTPATIENT)
Dept: MRI IMAGING | Facility: CLINIC | Age: 67
End: 2017-08-07

## 2017-08-07 LAB — VANCOMYCIN TROUGH SERPL-MCNC: 14.3 UG/ML — SIGNIFICANT CHANGE UP (ref 10–20)

## 2017-08-07 PROCEDURE — 99232 SBSQ HOSP IP/OBS MODERATE 35: CPT

## 2017-08-07 RX ORDER — INSULIN GLARGINE 100 [IU]/ML
50 INJECTION, SOLUTION SUBCUTANEOUS AT BEDTIME
Qty: 0 | Refills: 0 | Status: DISCONTINUED | OUTPATIENT
Start: 2017-08-07 | End: 2017-08-08

## 2017-08-07 RX ORDER — GABAPENTIN 400 MG/1
300 CAPSULE ORAL EVERY 8 HOURS
Qty: 0 | Refills: 0 | Status: DISCONTINUED | OUTPATIENT
Start: 2017-08-07 | End: 2017-08-10

## 2017-08-07 RX ORDER — INSULIN LISPRO 100/ML
VIAL (ML) SUBCUTANEOUS AT BEDTIME
Qty: 0 | Refills: 0 | Status: DISCONTINUED | OUTPATIENT
Start: 2017-08-07 | End: 2017-08-10

## 2017-08-07 RX ORDER — NYSTATIN CREAM 100000 [USP'U]/G
1 CREAM TOPICAL DAILY
Qty: 0 | Refills: 0 | Status: DISCONTINUED | OUTPATIENT
Start: 2017-08-07 | End: 2017-08-10

## 2017-08-07 RX ORDER — INSULIN LISPRO 100/ML
12 VIAL (ML) SUBCUTANEOUS
Qty: 0 | Refills: 0 | Status: DISCONTINUED | OUTPATIENT
Start: 2017-08-07 | End: 2017-08-08

## 2017-08-07 RX ADMIN — Medication 100 MILLIGRAM(S): at 05:12

## 2017-08-07 RX ADMIN — KETOCONAZOLE 1 APPLICATION(S): 20 AEROSOL, FOAM TOPICAL at 11:29

## 2017-08-07 RX ADMIN — PIPERACILLIN AND TAZOBACTAM 25 GRAM(S): 4; .5 INJECTION, POWDER, LYOPHILIZED, FOR SOLUTION INTRAVENOUS at 21:42

## 2017-08-07 RX ADMIN — PIPERACILLIN AND TAZOBACTAM 25 GRAM(S): 4; .5 INJECTION, POWDER, LYOPHILIZED, FOR SOLUTION INTRAVENOUS at 05:13

## 2017-08-07 RX ADMIN — ENOXAPARIN SODIUM 40 MILLIGRAM(S): 100 INJECTION SUBCUTANEOUS at 11:29

## 2017-08-07 RX ADMIN — Medication 4: at 09:16

## 2017-08-07 RX ADMIN — OXYCODONE AND ACETAMINOPHEN 2 TABLET(S): 5; 325 TABLET ORAL at 00:20

## 2017-08-07 RX ADMIN — PANTOPRAZOLE SODIUM 40 MILLIGRAM(S): 20 TABLET, DELAYED RELEASE ORAL at 05:13

## 2017-08-07 RX ADMIN — Medication 6: at 13:13

## 2017-08-07 RX ADMIN — INSULIN GLARGINE 50 UNIT(S): 100 INJECTION, SOLUTION SUBCUTANEOUS at 21:42

## 2017-08-07 RX ADMIN — ATENOLOL 50 MILLIGRAM(S): 25 TABLET ORAL at 05:13

## 2017-08-07 RX ADMIN — NYSTATIN CREAM 1 APPLICATION(S): 100000 CREAM TOPICAL at 13:11

## 2017-08-07 RX ADMIN — Medication 12 UNIT(S): at 18:07

## 2017-08-07 RX ADMIN — OXYCODONE AND ACETAMINOPHEN 2 TABLET(S): 5; 325 TABLET ORAL at 21:41

## 2017-08-07 RX ADMIN — PIPERACILLIN AND TAZOBACTAM 25 GRAM(S): 4; .5 INJECTION, POWDER, LYOPHILIZED, FOR SOLUTION INTRAVENOUS at 13:11

## 2017-08-07 RX ADMIN — GABAPENTIN 300 MILLIGRAM(S): 400 CAPSULE ORAL at 21:41

## 2017-08-07 RX ADMIN — Medication 5 MILLIGRAM(S): at 23:42

## 2017-08-07 RX ADMIN — PREGABALIN 1000 MICROGRAM(S): 225 CAPSULE ORAL at 11:29

## 2017-08-07 RX ADMIN — GABAPENTIN 200 MILLIGRAM(S): 400 CAPSULE ORAL at 05:12

## 2017-08-07 RX ADMIN — Medication 250 MILLIGRAM(S): at 18:41

## 2017-08-07 RX ADMIN — GABAPENTIN 200 MILLIGRAM(S): 400 CAPSULE ORAL at 13:13

## 2017-08-07 RX ADMIN — Medication 250 MILLIGRAM(S): at 05:13

## 2017-08-07 RX ADMIN — Medication 200 MICROGRAM(S): at 05:13

## 2017-08-07 RX ADMIN — OXYCODONE AND ACETAMINOPHEN 2 TABLET(S): 5; 325 TABLET ORAL at 22:30

## 2017-08-07 RX ADMIN — Medication 145 MILLIGRAM(S): at 11:29

## 2017-08-07 RX ADMIN — Medication 6: at 21:43

## 2017-08-07 RX ADMIN — Medication 6 UNIT(S): at 13:13

## 2017-08-07 RX ADMIN — Medication 6: at 18:07

## 2017-08-07 RX ADMIN — Medication 6 UNIT(S): at 09:16

## 2017-08-07 RX ADMIN — LISINOPRIL 40 MILLIGRAM(S): 2.5 TABLET ORAL at 05:12

## 2017-08-08 DIAGNOSIS — M50.323 OTHER CERVICAL DISC DEGENERATION AT C6-C7 LEVEL: ICD-10-CM

## 2017-08-08 DIAGNOSIS — D49.2 NEOPLASM OF UNSPECIFIED BEHAVIOR OF BONE, SOFT TISSUE, AND SKIN: ICD-10-CM

## 2017-08-08 DIAGNOSIS — M50.322 OTHER CERVICAL DISC DEGENERATION AT C5-C6 LEVEL: ICD-10-CM

## 2017-08-08 PROCEDURE — 99232 SBSQ HOSP IP/OBS MODERATE 35: CPT

## 2017-08-08 RX ORDER — INSULIN GLARGINE 100 [IU]/ML
55 INJECTION, SOLUTION SUBCUTANEOUS AT BEDTIME
Qty: 0 | Refills: 0 | Status: DISCONTINUED | OUTPATIENT
Start: 2017-08-08 | End: 2017-08-10

## 2017-08-08 RX ORDER — INSULIN LISPRO 100/ML
16 VIAL (ML) SUBCUTANEOUS
Qty: 0 | Refills: 0 | Status: DISCONTINUED | OUTPATIENT
Start: 2017-08-08 | End: 2017-08-10

## 2017-08-08 RX ADMIN — Medication 200 MICROGRAM(S): at 05:04

## 2017-08-08 RX ADMIN — GABAPENTIN 300 MILLIGRAM(S): 400 CAPSULE ORAL at 22:35

## 2017-08-08 RX ADMIN — PIPERACILLIN AND TAZOBACTAM 25 GRAM(S): 4; .5 INJECTION, POWDER, LYOPHILIZED, FOR SOLUTION INTRAVENOUS at 05:05

## 2017-08-08 RX ADMIN — Medication 12 UNIT(S): at 09:19

## 2017-08-08 RX ADMIN — Medication 4: at 17:52

## 2017-08-08 RX ADMIN — LISINOPRIL 40 MILLIGRAM(S): 2.5 TABLET ORAL at 05:04

## 2017-08-08 RX ADMIN — Medication 250 MILLIGRAM(S): at 17:28

## 2017-08-08 RX ADMIN — INSULIN GLARGINE 55 UNIT(S): 100 INJECTION, SOLUTION SUBCUTANEOUS at 23:17

## 2017-08-08 RX ADMIN — PIPERACILLIN AND TAZOBACTAM 25 GRAM(S): 4; .5 INJECTION, POWDER, LYOPHILIZED, FOR SOLUTION INTRAVENOUS at 13:57

## 2017-08-08 RX ADMIN — HYDROMORPHONE HYDROCHLORIDE 1 MILLIGRAM(S): 2 INJECTION INTRAMUSCULAR; INTRAVENOUS; SUBCUTANEOUS at 23:40

## 2017-08-08 RX ADMIN — Medication 12 UNIT(S): at 12:56

## 2017-08-08 RX ADMIN — NYSTATIN CREAM 1 APPLICATION(S): 100000 CREAM TOPICAL at 11:30

## 2017-08-08 RX ADMIN — GABAPENTIN 300 MILLIGRAM(S): 400 CAPSULE ORAL at 13:10

## 2017-08-08 RX ADMIN — PREGABALIN 1000 MICROGRAM(S): 225 CAPSULE ORAL at 11:29

## 2017-08-08 RX ADMIN — KETOCONAZOLE 1 APPLICATION(S): 20 AEROSOL, FOAM TOPICAL at 11:30

## 2017-08-08 RX ADMIN — Medication 5 MILLIGRAM(S): at 22:43

## 2017-08-08 RX ADMIN — Medication 2: at 09:19

## 2017-08-08 RX ADMIN — PANTOPRAZOLE SODIUM 40 MILLIGRAM(S): 20 TABLET, DELAYED RELEASE ORAL at 05:04

## 2017-08-08 RX ADMIN — GABAPENTIN 300 MILLIGRAM(S): 400 CAPSULE ORAL at 05:04

## 2017-08-08 RX ADMIN — Medication 2: at 12:56

## 2017-08-08 RX ADMIN — HYDROMORPHONE HYDROCHLORIDE 1 MILLIGRAM(S): 2 INJECTION INTRAMUSCULAR; INTRAVENOUS; SUBCUTANEOUS at 22:43

## 2017-08-08 RX ADMIN — ENOXAPARIN SODIUM 40 MILLIGRAM(S): 100 INJECTION SUBCUTANEOUS at 11:29

## 2017-08-08 RX ADMIN — ATENOLOL 50 MILLIGRAM(S): 25 TABLET ORAL at 05:04

## 2017-08-08 RX ADMIN — Medication 250 MILLIGRAM(S): at 05:05

## 2017-08-08 RX ADMIN — Medication 16 UNIT(S): at 17:52

## 2017-08-08 RX ADMIN — Medication 145 MILLIGRAM(S): at 11:29

## 2017-08-08 RX ADMIN — PIPERACILLIN AND TAZOBACTAM 25 GRAM(S): 4; .5 INJECTION, POWDER, LYOPHILIZED, FOR SOLUTION INTRAVENOUS at 22:50

## 2017-08-08 NOTE — ADVANCED PRACTICE NURSE CONSULT - RECOMMEDATIONS
Thoracic/Lumbar spine, surgical incision: Cleanse with NS, pat dry. Apply Liquid barrier film to periwound skin. Apply Aquacel AG hydrofiber over incision, cover with foam with border. Change daily and PRN.    Please call wound care service line is further assistance is needed ([phone]).

## 2017-08-08 NOTE — H&P PST ADULT - FUNCTIONAL LEVEL PRIOR: TOILETING
Recommended patient be sure to get at least 70 grams of protein per day by eating small, frequent meals all with high lean protein choices. Be sure to limit/cut back on daily carbohydrate intake. Discussed with the patient the recommended amount of water per day to intake. Reviewed vitamin requirements. Be sure to do routine exercise including both cardio and strength training.   
(0) independent

## 2017-08-08 NOTE — ADVANCED PRACTICE NURSE CONSULT - REASON FOR CONSULT
Patient seen on skin care rounds after wound care referral received for assessment of skin impairment and recommendations of topical management. Chart reviewed: Serum Hgb A1c 6.2%, Jose Luis 17, BMI 38.1kg/m2, patient interviewed. Patient H/O obesity, shingles, intradural tumor, malignant neoplasm of thyroid, gastric ulcer, DM, spinal stenosis, arthritis, vitamin B12 deficiency, neuropathy, HTN, HLD. Admitted for lumbar laminectomy T12- L2 for resection of intradural tumor.

## 2017-08-08 NOTE — ADVANCED PRACTICE NURSE CONSULT - ASSESSMENT
A&Ox4, continent of urine and stool. Skin warm, dry with increased moisture in intertriginous folds, adequate skin turgor.    Surgical Incision from Thoracic spine to Lumbar spine: 14cm in length with 38 intact sutures: well approximated There is an area at distal end of surgical incision with a moderate amount of serosanguineous drainage, no odor. Periwound skin intact, (+) erythema, no increased warmth, no induration. No able to express drainage with palpation. Goal of care: manage exudate, decrease/control bioburden.

## 2017-08-09 LAB — VANCOMYCIN TROUGH SERPL-MCNC: 16.5 UG/ML — SIGNIFICANT CHANGE UP (ref 10–20)

## 2017-08-09 PROCEDURE — 99222 1ST HOSP IP/OBS MODERATE 55: CPT | Mod: GC

## 2017-08-09 PROCEDURE — 71010: CPT | Mod: 26

## 2017-08-09 RX ADMIN — Medication 16 UNIT(S): at 12:37

## 2017-08-09 RX ADMIN — Medication 16 UNIT(S): at 09:22

## 2017-08-09 RX ADMIN — PIPERACILLIN AND TAZOBACTAM 25 GRAM(S): 4; .5 INJECTION, POWDER, LYOPHILIZED, FOR SOLUTION INTRAVENOUS at 07:17

## 2017-08-09 RX ADMIN — Medication 2: at 09:23

## 2017-08-09 RX ADMIN — OXYCODONE AND ACETAMINOPHEN 2 TABLET(S): 5; 325 TABLET ORAL at 12:37

## 2017-08-09 RX ADMIN — ENOXAPARIN SODIUM 40 MILLIGRAM(S): 100 INJECTION SUBCUTANEOUS at 12:23

## 2017-08-09 RX ADMIN — OXYCODONE AND ACETAMINOPHEN 2 TABLET(S): 5; 325 TABLET ORAL at 05:37

## 2017-08-09 RX ADMIN — Medication 2: at 12:38

## 2017-08-09 RX ADMIN — GABAPENTIN 300 MILLIGRAM(S): 400 CAPSULE ORAL at 12:22

## 2017-08-09 RX ADMIN — INSULIN GLARGINE 55 UNIT(S): 100 INJECTION, SOLUTION SUBCUTANEOUS at 22:01

## 2017-08-09 RX ADMIN — Medication 16 UNIT(S): at 17:51

## 2017-08-09 RX ADMIN — ATENOLOL 50 MILLIGRAM(S): 25 TABLET ORAL at 05:33

## 2017-08-09 RX ADMIN — OXYCODONE AND ACETAMINOPHEN 2 TABLET(S): 5; 325 TABLET ORAL at 21:58

## 2017-08-09 RX ADMIN — GABAPENTIN 300 MILLIGRAM(S): 400 CAPSULE ORAL at 22:07

## 2017-08-09 RX ADMIN — Medication 250 MILLIGRAM(S): at 09:24

## 2017-08-09 RX ADMIN — Medication 2: at 17:51

## 2017-08-09 RX ADMIN — PANTOPRAZOLE SODIUM 40 MILLIGRAM(S): 20 TABLET, DELAYED RELEASE ORAL at 05:33

## 2017-08-09 RX ADMIN — LISINOPRIL 40 MILLIGRAM(S): 2.5 TABLET ORAL at 05:33

## 2017-08-09 RX ADMIN — OXYCODONE AND ACETAMINOPHEN 2 TABLET(S): 5; 325 TABLET ORAL at 22:30

## 2017-08-09 RX ADMIN — Medication 200 MICROGRAM(S): at 05:33

## 2017-08-09 RX ADMIN — Medication 145 MILLIGRAM(S): at 12:22

## 2017-08-09 RX ADMIN — GABAPENTIN 300 MILLIGRAM(S): 400 CAPSULE ORAL at 05:33

## 2017-08-09 RX ADMIN — OXYCODONE AND ACETAMINOPHEN 2 TABLET(S): 5; 325 TABLET ORAL at 12:40

## 2017-08-09 RX ADMIN — PREGABALIN 1000 MICROGRAM(S): 225 CAPSULE ORAL at 12:22

## 2017-08-09 RX ADMIN — OXYCODONE AND ACETAMINOPHEN 2 TABLET(S): 5; 325 TABLET ORAL at 06:30

## 2017-08-09 RX ADMIN — NYSTATIN CREAM 1 APPLICATION(S): 100000 CREAM TOPICAL at 12:23

## 2017-08-09 NOTE — CONSULT NOTE ADULT - PROBLEM SELECTOR RECOMMENDATION 9
1. PT- bed mobility,transfers, gait and balance training  2. OT- ADL'S  3. DM- continue f/u for better glycemic control   4. Patient would benefit from acute rehab, needs a multidisciplinary team including PT, OT. Can tolerate 3 hours of therapy a day.  Will follow.

## 2017-08-09 NOTE — CONSULT NOTE ADULT - SUBJECTIVE AND OBJECTIVE BOX
Patient is a 66 y/o RHF with a PMHx of thyroid CA s/p thyroidectomy, spinal stenosis s/p laminectomy (L4-L5 and L5-S1), HTN, HLP, DMII, who was scheduled for laminectomy T12-L2 for resection of schwannoma at L1. Patient states that she has a history of herniated disc since 1/2017 and has had back pain since, more on the left. She received 3 doses of Ancef post-op and was on bedrest w/ head of bed flat x 24 hours following the procedure. Patient became febrile 3 days after procedure. Chest XRAY showed RUL infiltrate suggestive of pneumonia. Most likely due to aspiration - especially since patient's management post spinal surgery included lying flat for 24 hours, per medicine team. Patient was started on vancomycin and zosyn. Patient’s pain is currently under control and stated she ambulated to the bathroom with minimal assistance yesterday.       REVIEW OF SYSTEMS: No chest pain, shortness of breath, nausea, vomiting or diarhea.      PAST MEDICAL & SURGICAL HISTORY  Obese  Shingles  Intradural tumor  Spinal stenosis  Malignant neoplasm thyroid  Vitamin B 12 deficiency  Arthritis  Neuropathy  Hypertension  Diabetes mellitus  Hyperlipidemia  Stomach ulcer  Carpal tunnel syndrome  S/P thyroidectomy  Kidney stones  lipoma removed  laminectomy  cholecystectomy  tonsillectomy  appendectomy      SOCIAL HISTORY  Smoking - Denied, EtOH - Denied, Drugs - Denied    FUNCTIONAL HISTORY:   Lives with spouse, + stairs   Independent PTA     CURRENT FUNCTIONAL STATUS: min a       FAMILY HISTORY - n/c to rehab           VITALS  T(C): 36.6 (08-09-17 @ 10:52), Max: 37.3 (08-08-17 @ 21:41)  HR: 61 (08-09-17 @ 10:52) (61 - 70)  BP: 126/61 (08-09-17 @ 10:52) (122/66 - 133/74)  RR: 18 (08-09-17 @ 10:52) (16 - 18)  SpO2: 98% (08-09-17 @ 10:52) (97% - 98%)  Wt(kg): --    ALLERGIES  azithromycin (Other)      MEDICATIONS   lisinopril 40 milliGRAM(s) Oral daily  ATENolol  Tablet 50 milliGRAM(s) Oral daily  fenofibrate Tablet 145 milliGRAM(s) Oral daily  pantoprazole    Tablet 40 milliGRAM(s) Oral before breakfast  levothyroxine 200 MICROGram(s) Oral daily  cyanocobalamin 1000 MICROGram(s) Oral daily  acetaminophen   Tablet 650 milliGRAM(s) Oral every 6 hours PRN  docusate sodium 100 milliGRAM(s) Oral three times a day  senna 2 Tablet(s) Oral at bedtime  insulin lispro (HumaLOG) corrective regimen sliding scale   SubCutaneous three times a day before meals  dextrose Gel 1 Dose(s) Oral once PRN  dextrose 50% Injectable 12.5 Gram(s) IV Push once  dextrose 50% Injectable 25 Gram(s) IV Push once  dextrose 50% Injectable 25 Gram(s) IV Push once  glucagon  Injectable 1 milliGRAM(s) IntraMuscular once PRN  enoxaparin Injectable 40 milliGRAM(s) SubCutaneous daily  oxyCODONE    5 mG/acetaminophen 325 mG 2 Tablet(s) Oral every 6 hours PRN  oxyCODONE    5 mG/acetaminophen 325 mG 1 Tablet(s) Oral every 4 hours PRN  HYDROmorphone   Tablet 1 milliGRAM(s) Oral every 6 hours PRN  polyethylene glycol 3350 17 Gram(s) Oral every 12 hours  diazepam    Tablet 5 milliGRAM(s) Oral every 6 hours PRN  ketoconazole 2% Cream 1 Application(s) Topical daily  nystatin Powder 1 Application(s) Topical daily  insulin lispro (HumaLOG) corrective regimen sliding scale   SubCutaneous at bedtime  gabapentin 300 milliGRAM(s) Oral every 8 hours  insulin glargine Injectable (LANTUS) 55 Unit(s) SubCutaneous at bedtime  insulin lispro Injectable (HumaLOG) 16 Unit(s) SubCutaneous three times a day before meals      ----------------------------------------------------------------------------------------  PHYSICAL EXAM  Constitutional - NAD, Comfortable  HEENT - NCAT, EOMI  Neck - Supple, No limited ROM  Chest - CTA bilaterally, No wheeze, No rhonchi, No crackles  Cardiovascular - RRR, S1S2, No murmurs  Abdomen - BS+, Soft, NTND  Extremities - No C/C/E, No calf tenderness   Neurologic Exam -                    Cognitive - Awake, Alert, AAO to self, place, date, year, situation     Communication - Fluent, No dysarthria, no aphasia     Cranial Nerves - CN 2-12 intact     Motor - MS 5/5 b/l LE, UE                        Sensory - Intact to LT     Reflexes - DTR Intact, No primitive reflexive     Balance -wide based gait, impaired balance   Psychiatric - Mood stable, Affect WNL

## 2017-08-09 NOTE — CONSULT NOTE ADULT - PROBLEM SELECTOR PROBLEM 1
Intradural tumor
Fever
Type 2 diabetes mellitus with hyperglycemia, with long-term current use of insulin

## 2017-08-10 ENCOUNTER — TRANSCRIPTION ENCOUNTER (OUTPATIENT)
Age: 67
End: 2017-08-10

## 2017-08-10 VITALS — WEIGHT: 142.86 LBS

## 2017-08-10 LAB
BACTERIA BLD CULT: SIGNIFICANT CHANGE UP
BACTERIA BLD CULT: SIGNIFICANT CHANGE UP

## 2017-08-10 RX ORDER — NYSTATIN CREAM 100000 [USP'U]/G
1 CREAM TOPICAL
Qty: 0 | Refills: 0 | COMMUNITY
Start: 2017-08-10

## 2017-08-10 RX ORDER — DOCUSATE SODIUM 100 MG
1 CAPSULE ORAL
Qty: 0 | Refills: 0 | COMMUNITY
Start: 2017-08-10

## 2017-08-10 RX ORDER — OXYCODONE HYDROCHLORIDE 5 MG/1
2 TABLET ORAL
Qty: 0 | Refills: 0 | COMMUNITY
Start: 2017-08-10

## 2017-08-10 RX ORDER — OXYCODONE HYDROCHLORIDE 5 MG/1
1 TABLET ORAL
Qty: 0 | Refills: 0 | COMMUNITY
Start: 2017-08-10

## 2017-08-10 RX ORDER — ACETAMINOPHEN 500 MG
2 TABLET ORAL
Qty: 0 | Refills: 0 | COMMUNITY
Start: 2017-08-10

## 2017-08-10 RX ORDER — DIAZEPAM 5 MG
1 TABLET ORAL
Qty: 0 | Refills: 0 | COMMUNITY
Start: 2017-08-10

## 2017-08-10 RX ORDER — POLYETHYLENE GLYCOL 3350 17 G/17G
17 POWDER, FOR SOLUTION ORAL
Qty: 0 | Refills: 0 | COMMUNITY
Start: 2017-08-10

## 2017-08-10 RX ADMIN — OXYCODONE AND ACETAMINOPHEN 1 TABLET(S): 5; 325 TABLET ORAL at 05:24

## 2017-08-10 RX ADMIN — GABAPENTIN 300 MILLIGRAM(S): 400 CAPSULE ORAL at 05:20

## 2017-08-10 RX ADMIN — OXYCODONE AND ACETAMINOPHEN 2 TABLET(S): 5; 325 TABLET ORAL at 11:06

## 2017-08-10 RX ADMIN — ENOXAPARIN SODIUM 40 MILLIGRAM(S): 100 INJECTION SUBCUTANEOUS at 11:07

## 2017-08-10 RX ADMIN — PANTOPRAZOLE SODIUM 40 MILLIGRAM(S): 20 TABLET, DELAYED RELEASE ORAL at 07:24

## 2017-08-10 RX ADMIN — LISINOPRIL 40 MILLIGRAM(S): 2.5 TABLET ORAL at 05:20

## 2017-08-10 RX ADMIN — Medication 145 MILLIGRAM(S): at 11:07

## 2017-08-10 RX ADMIN — Medication 16 UNIT(S): at 09:21

## 2017-08-10 RX ADMIN — Medication 2: at 13:05

## 2017-08-10 RX ADMIN — OXYCODONE AND ACETAMINOPHEN 2 TABLET(S): 5; 325 TABLET ORAL at 11:30

## 2017-08-10 RX ADMIN — Medication 200 MICROGRAM(S): at 05:20

## 2017-08-10 RX ADMIN — ATENOLOL 50 MILLIGRAM(S): 25 TABLET ORAL at 05:20

## 2017-08-10 RX ADMIN — OXYCODONE AND ACETAMINOPHEN 1 TABLET(S): 5; 325 TABLET ORAL at 06:19

## 2017-08-10 RX ADMIN — GABAPENTIN 300 MILLIGRAM(S): 400 CAPSULE ORAL at 12:53

## 2017-08-10 RX ADMIN — PREGABALIN 1000 MICROGRAM(S): 225 CAPSULE ORAL at 11:07

## 2017-08-10 RX ADMIN — Medication 16 UNIT(S): at 13:05

## 2017-08-10 NOTE — DISCHARGE NOTE ADULT - PLAN OF CARE
for improvement of symptoms Sutures to be removed on 8/16/17 (POD #14). May be removed in rehab or please arrange transfer to Dr. Hodge's office for him to remove otherwise please Follow up with Dr. Hodge after rehab.   Continue with daily dressing changes as it was done in the hospital   LUMBAR WOUND CARE INSTRUCTION:   Cleanse with normal saline, pat dry. Apply Liquid barrier film to periwound skin. Apply Aquacel AG hydrofiber over incision, cover with foam with border. Change daily and PRN.  Notify Dr. Hodge immediately if significant drainage noted from wound or high fevers.  At time of discharge scant drainage noted on dressing that was not reproducible.  May get incision wet in regular shower- ensure to pat area dry with clean towel. Strict blood sugar control Continue with diabetic diet  Continue with home medications

## 2017-08-10 NOTE — DIETITIAN INITIAL EVALUATION ADULT. - ENERGY NEEDS
Current weight 213.8lbs (8/10) Ht 66" BMI 34.5kg/m2  IBW: 130lbs (+/-10%) %IBW: 164%  No edema or pressure ulcers noted.

## 2017-08-10 NOTE — PROGRESS NOTE ADULT - ASSESSMENT
67 y.o. female with h/o Type 2 DM, HTN, hypothyroidism s/p surgery and I 131 for thyroid cancer s/p intradural tumor resection and T12-L2 fusion, with hyperglycemia inpatient.
67 y.o. female with h/o Type 2 DM, HTN, hypothyroidism s/p surgery and I 131 for thyroid cancer s/p intradural tumor resection and T12-L2 fusion, with hyperglycemia inpatient.
67 year old F s/p lumbar laminectomy for intradural schwanoma POD # 8
67F s/p resection of intradural tumor w/ thoracolumbar fusion. Neurologically stable
67y female s/p lumbar lami for intradural tumor
Doing well
Intradural Schwannoma/ Lumbar lami
Lumbar lami/ intradural schwannoma
S/p Resection of Intradural shwannoma
Stable postop L-1 laminectomy, resection of schwannoma

## 2017-08-10 NOTE — DIETITIAN INITIAL EVALUATION ADULT. - OTHER INFO
Pt seen for LOS day 8. 68 y/o with medical history of thyroid CA s/p thyroidectomy, spinal stenosis s/p laminectomy, DMII. Reports good appetite and PO intake >75%. Patient denies any nausea/vomiting/diarrhea/constipation or difficulty chewing and swallowing. NKFA. +BM today. Pt reports good adherence to consistent carbohydrate diet PTA. Reviewed therapeutic diet recommendations. Pt receptive to all information provided. Verbalized good understanding. No further questions or concerns voiced at this time. Pt made aware RDN remains available.

## 2017-08-10 NOTE — DISCHARGE NOTE ADULT - HOSPITAL COURSE
66 y/o RHF with a PMHx of thyroid CA s/p thyroidectomy, spinal stenosis s/p laminectomy (L4-L5 and L5-S1), HTN, HLP, DMII, who was scheduled for laminectomy T12-L2 for resection of schwannoma at L1. Patient states that she has a history of herniated disc since 1/2017 and has had back pain since, more on the left. She received 3 doses of Ancef post-op and was on bedrest w/ head of bed flat x 24 hours following the procedure. Patient became febrile 3 days after procedure. Chest XRAY showed RUL infiltrate suggestive of pneumonia. Most likely due to aspiration - especially since patient's management post spinal surgery included lying flat for 48hours, per medicine team. Patient was started on vancomycin and zosyn that were stopped on 8/9/17 and patient remained afebrile. Chest Xray on 8/10 showed clear lung Patient’s pain is currently under control and stated she ambulated to the bathroom with minimal assistance. PT/PMR evaluation recommended acute rehab  Patient stable for discharge to [facility] rehab on 8/10/17

## 2017-08-10 NOTE — PROGRESS NOTE ADULT - SUBJECTIVE AND OBJECTIVE BOX
Day _2_ of Anesthesia Pain Management Service    Allergies    azithromycin (Other)    Intolerances    SUBJECTIVE: " I have a constant ache in my back. The pump doesn't help enough. I also have a sharp pain in my right foot."    Pain Scale Score	At rest: _6-7/10_ 	With Activity: ___ 	[ ] Refer to charted pain scores    THERAPY:    [ ] IV PCA Morphine		[ ] 5 mg/mL	[ ] 1 mg/mL  [X] IV PCA Hydromorphone	[ ] 5 mg/mL	[X] 1 mg/mL  [ ] IV PCA Fentanyl		[ ] 50 micrograms/mL    Demand dose _0.25mg_ lockout _6_ (minutes) Continuous Rate _0_ Total: _7.1mg_  Daily      MEDICATIONS  (STANDING):  lisinopril 40 milliGRAM(s) Oral daily  ATENolol  Tablet 50 milliGRAM(s) Oral daily  fenofibrate Tablet 145 milliGRAM(s) Oral daily  pantoprazole    Tablet 40 milliGRAM(s) Oral before breakfast  levothyroxine 200 MICROGram(s) Oral daily  calcium carbonate  625 mG + Vitamin D (OsCal 250 + D) 1 Tablet(s) Oral daily  cyanocobalamin 1000 MICROGram(s) Oral daily  sodium chloride 0.9% with potassium chloride 20 mEq/L 1000 milliLiter(s) (75 mL/Hr) IV Continuous <Continuous>  ceFAZolin   IVPB 2000 milliGRAM(s) IV Intermittent every 8 hours  docusate sodium 100 milliGRAM(s) Oral three times a day  senna 2 Tablet(s) Oral at bedtime  insulin lispro (HumaLOG) corrective regimen sliding scale   SubCutaneous three times a day before meals  insulin lispro (HumaLOG) corrective regimen sliding scale   SubCutaneous at bedtime  dextrose 5%. 1000 milliLiter(s) (50 mL/Hr) IV Continuous <Continuous>  dextrose 50% Injectable 12.5 Gram(s) IV Push once  dextrose 50% Injectable 25 Gram(s) IV Push once  dextrose 50% Injectable 25 Gram(s) IV Push once  enoxaparin Injectable 40 milliGRAM(s) SubCutaneous daily  HYDROmorphone PCA (1 mG/mL) 30 milliLiter(s) PCA Continuous PCA Continuous  acetaminophen   Tablet. 650 milliGRAM(s) Oral every 6 hours  gabapentin 200 milliGRAM(s) Oral every 8 hours    MEDICATIONS  (PRN):  acetaminophen   Tablet 650 milliGRAM(s) Oral every 6 hours PRN For Temp greater than 38 C (100.4 F)  dextrose Gel 1 Dose(s) Oral once PRN Blood Glucose LESS THAN 70 milliGRAM(s)/deciliter  glucagon  Injectable 1 milliGRAM(s) IntraMuscular once PRN Glucose LESS THAN 70 milligrams/deciliter  naloxone Injectable 0.1 milliGRAM(s) IV Push every 3 minutes PRN For ANY of the following changes in patient status:  A. RR LESS THAN 10 breaths per minute, B. Oxygen saturation LESS THAN 90%, C. Sedation score of 6  ondansetron Injectable 4 milliGRAM(s) IV Push every 6 hours PRN Nausea  HYDROmorphone  Injectable 1 milliGRAM(s) IV Push every 10 minutes PRN Moderate Pain      OBJECTIVE: Asleep, NAD, supine in bed. Awakens to verbal stimuli. Adult Children at bedside.    Sedation Score:	[] Alert	[ ] Drowsy	[x] Arousable	[x] Asleep	[ ] Unresponsive    Side Effects:	[X] None	[ ] Nausea	[ ] Vomiting	[ ] Pruritus  		  [ ] Weakness		[ ] Numbness	[ ] Other:                              11.9   19.01 )-----------( 244      ( 03 Aug 2017 07:40 )             36.8       08-03    137  |  100  |  30<H>  ----------------------------<  267<H>  4.6   |  21<L>  |  1.57<H>    Ca    7.9<L>      03 Aug 2017 07:40        ASSESSMENT/ PLAN    Therapy to  be:	[X] Continue   [ ] Discontinued   [ ] Change to prn Analgesics    Documentation and Verification of current medications:  [X] Done	[ ] Not done, not eligible  [ ] Not done, reason not given    [x]  [place]  Reviewed and Copied to Chart. Reference #: [id number]    Comments:  Add Zanaflex q8hrs for spams x 3 days. May be increased to q6hrs if well tolerated, not oversedated, and requires higher frequency  Add Tylenol as non-opioid adjunct  Increase Gabapentin to 600mg/day in divided doses  Oral opioids 8/4/17
Anesthesia Pain Management Service    SUBJECTIVE: Patient is doing well with IV PCA and no significant problems reported.    Pain Scale Score	At rest: ___ 	With Activity: ___ 	[X ] Refer to charted pain scores    THERAPY:    [ ] IV PCA Morphine		[ ] 5 mg/mL	[ ] 1 mg/mL  [X ] IV PCA Hydromorphone	[ ] 5 mg/mL	[X ] 1 mg/mL  [ ] IV PCA Fentanyl		[ ] 50 micrograms/mL    Demand dose __0.2_ lockout __6_ (minutes) Continuous Rate _0__ Total: 17.1___  Daily      MEDICATIONS  (STANDING):  lisinopril 40 milliGRAM(s) Oral daily  ATENolol  Tablet 50 milliGRAM(s) Oral daily  fenofibrate Tablet 145 milliGRAM(s) Oral daily  pantoprazole    Tablet 40 milliGRAM(s) Oral before breakfast  levothyroxine 200 MICROGram(s) Oral daily  cyanocobalamin 1000 MICROGram(s) Oral daily  sodium chloride 0.9% with potassium chloride 20 mEq/L 1000 milliLiter(s) (75 mL/Hr) IV Continuous <Continuous>  docusate sodium 100 milliGRAM(s) Oral three times a day  senna 2 Tablet(s) Oral at bedtime  insulin lispro (HumaLOG) corrective regimen sliding scale   SubCutaneous three times a day before meals  insulin lispro (HumaLOG) corrective regimen sliding scale   SubCutaneous at bedtime  dextrose 50% Injectable 12.5 Gram(s) IV Push once  dextrose 50% Injectable 25 Gram(s) IV Push once  dextrose 50% Injectable 25 Gram(s) IV Push once  enoxaparin Injectable 40 milliGRAM(s) SubCutaneous daily  HYDROmorphone PCA (1 mG/mL) 30 milliLiter(s) PCA Continuous PCA Continuous  gabapentin 200 milliGRAM(s) Oral every 8 hours  tiZANidine 2 milliGRAM(s) Oral every 8 hours    MEDICATIONS  (PRN):  acetaminophen   Tablet 650 milliGRAM(s) Oral every 6 hours PRN For Temp greater than 38 C (100.4 F)  dextrose Gel 1 Dose(s) Oral once PRN Blood Glucose LESS THAN 70 milliGRAM(s)/deciliter  glucagon  Injectable 1 milliGRAM(s) IntraMuscular once PRN Glucose LESS THAN 70 milligrams/deciliter  naloxone Injectable 0.1 milliGRAM(s) IV Push every 3 minutes PRN For ANY of the following changes in patient status:  A. RR LESS THAN 10 breaths per minute, B. Oxygen saturation LESS THAN 90%, C. Sedation score of 6  ondansetron Injectable 4 milliGRAM(s) IV Push every 6 hours PRN Nausea  HYDROmorphone  Injectable 1 milliGRAM(s) IV Push every 10 minutes PRN Moderate Pain      OBJECTIVE:    Sedation Score:	[ X] Alert	[ ] Drowsy 	[ ] Arousable	[ ] Asleep	[ ] Unresponsive    Side Effects:	[X ] None	[ ] Nausea	[ ] Vomiting	[ ] Pruritus  		[ ] Other:    Vital Signs Last 24 Hrs  T(C): 37 (04 Aug 2017 13:51), Max: 37.1 (04 Aug 2017 05:49)  T(F): 98.6 (04 Aug 2017 13:51), Max: 98.8 (04 Aug 2017 05:49)  HR: 77 (04 Aug 2017 13:51) (72 - 87)  BP: 117/52 (04 Aug 2017 13:51) (103/48 - 125/52)  BP(mean): --  RR: 19 (04 Aug 2017 13:51) (18 - 20)  SpO2: 96% (04 Aug 2017 13:51) (95% - 100%)    ASSESSMENT/ PLAN    Therapy to  be:	[ ] Continue   [ X] Discontinued   [X ] Change to prn Analgesics    Documentation and Verification of current medications:   [X] Done	[ ] Not done, not elligible    Comments: PRN Oral/IV opioids and/or Adjuvant medication to be ordered at this point.
Chief Complaint: DM2    History: Eating about 50 % of meals. No hypoglycemia.  C/o foot pain.    MEDICATIONS  (STANDING):  lisinopril 40 milliGRAM(s) Oral daily  ATENolol  Tablet 50 milliGRAM(s) Oral daily  fenofibrate Tablet 145 milliGRAM(s) Oral daily  pantoprazole    Tablet 40 milliGRAM(s) Oral before breakfast  levothyroxine 200 MICROGram(s) Oral daily  cyanocobalamin 1000 MICROGram(s) Oral daily  docusate sodium 100 milliGRAM(s) Oral three times a day  senna 2 Tablet(s) Oral at bedtime  insulin lispro (HumaLOG) corrective regimen sliding scale   SubCutaneous three times a day before meals  insulin lispro (HumaLOG) corrective regimen sliding scale   SubCutaneous at bedtime  dextrose 50% Injectable 12.5 Gram(s) IV Push once  dextrose 50% Injectable 25 Gram(s) IV Push once  dextrose 50% Injectable 25 Gram(s) IV Push once  enoxaparin Injectable 40 milliGRAM(s) SubCutaneous daily  gabapentin 200 milliGRAM(s) Oral every 8 hours  polyethylene glycol 3350 17 Gram(s) Oral every 12 hours  vancomycin  IVPB 1000 milliGRAM(s) IV Intermittent every 12 hours  vancomycin  IVPB   IV Intermittent   piperacillin/tazobactam IVPB. 3.375 Gram(s) IV Intermittent every 8 hours  ketoconazole 2% Cream 1 Application(s) Topical daily  insulin glargine Injectable (LANTUS) 24 Unit(s) SubCutaneous at bedtime  insulin lispro Injectable (HumaLOG) 6 Unit(s) SubCutaneous three times a day before meals  nystatin Powder 1 Application(s) Topical daily    MEDICATIONS  (PRN):  acetaminophen   Tablet 650 milliGRAM(s) Oral every 6 hours PRN For Temp greater than 38 C (100.4 F)  dextrose Gel 1 Dose(s) Oral once PRN Blood Glucose LESS THAN 70 milliGRAM(s)/deciliter  glucagon  Injectable 1 milliGRAM(s) IntraMuscular once PRN Glucose LESS THAN 70 milligrams/deciliter  oxyCODONE    5 mG/acetaminophen 325 mG 2 Tablet(s) Oral every 6 hours PRN Moderate Pain (4 - 6)  oxyCODONE    5 mG/acetaminophen 325 mG 1 Tablet(s) Oral every 4 hours PRN Mild Pain (1 - 3)  HYDROmorphone   Tablet 1 milliGRAM(s) Oral every 6 hours PRN Severe Pain (7 - 10)  diazepam    Tablet 5 milliGRAM(s) Oral every 6 hours PRN muscle spasm      Allergies    azithromycin (Other)    Intolerances      Review of Systems:    ALL OTHER SYSTEMS REVIEWED AND NEGATIVE      PHYSICAL EXAM:  VITALS: T(C): 37 (08-07-17 @ 09:57)  T(F): 98.6 (08-07-17 @ 09:57), Max: 100 (08-06-17 @ 21:40)  HR: 62 (08-07-17 @ 09:57) (62 - 67)  BP: 141/59 (08-07-17 @ 09:57) (138/68 - 142/63)  RR:  (16 - 18)  SpO2:  (94% - 98%)  Wt(kg): --  GENERAL: NAD, well-groomed, well-developed  EYES: No proptosis, no lid lag, anicteric  HEENT:  Atraumatic, Normocephalic, moist mucous membranes  SKIN: Dry, intact, No rashes or lesions  NEURO: extraocular movements intact, no tremor  PSYCH: Alert and oriented x 3, normal affect, normal mood    CAPILLARY BLOOD GLUCOSE  298 (08-07 @ 13:08)  242 (08-07 @ 08:41)  272 (08-06 @ 21:40)  246 (08-06 @ 17:42)  281 (08-06 @ 12:54)  260 (08-06 @ 09:14)  233 (08-05 @ 21:52)  270 (08-05 @ 16:51)  255 (08-05 @ 12:43)  169 (08-05 @ 08:59)  181 (08-04 @ 18:15)      08-05    135  |  96<L>  |  27<H>  ----------------------------<  220<H>  4.2   |  26  |  1.31<H>    EGFR if : 49  EGFR if non : 42    Ca    8.6      08-05            Thyroid Function Tests:      Hemoglobin A1C, Whole Blood: 6.2 % <H> [4.0 - 5.6] (08-06-17 @ 08:15)  Hemoglobin A1C, Whole Blood: 6.0 % <H> [4.0 - 5.6] (07-31-17 @ 10:15)
Day __2_ of Anesthesia Pain Management Service    SUBJECTIVE:    Therapy:	  [x ] IV PCA	   [ ] Epidural           [ ] s/p Spinal Opoid              [ ] Postpartum infusion	  [ ] Patient controlled regional anesthesia (PCRA)    [ ] prn Analgesics    OBJECTIVE:   [ x] No new signs     [ ] Other:    Side Effects:  [ x] None			[ ] Other:    Assessment of Catheter Site:		[ ] Intact		[ ] Other:    ASSESSMENT/PLAN  [x ] Continue current therapy    [ ] Therapy changed to:    [ ] IV PCA       [ ] Epidural     [ ] prn Analgesics     Comments:
HPI:  68y/o female scheduled for lumbar laminectomy T12- L2 for resection of intradural tumor on 8/2/2017.  Pt states, "hx herniated disc, since 1/2017 has back pain, xray showed vertebrae was being pressed, MRI showed mass.  Continues to have some back pain, more on the left side." (31 Jul 2017 07:06)      OVERNIGHT EVENTS:  No overnight events. Patient did ambulate w/ walker with PT yesterday Pain controlled.     Vital Signs Last 24 Hrs  T(C): 37.1 (09 Aug 2017 01:34), Max: 37.3 (08 Aug 2017 21:41)  T(F): 98.7 (09 Aug 2017 01:34), Max: 99.1 (08 Aug 2017 21:41)  HR: 64 (09 Aug 2017 01:34) (61 - 72)  BP: 128/67 (09 Aug 2017 01:34) (122/66 - 138/70)  BP(mean): --  RR: 18 (09 Aug 2017 01:34) (16 - 19)  SpO2: 97% (09 Aug 2017 01:34) (95% - 97%)    I&O's Summary    07 Aug 2017 07:01  -  08 Aug 2017 07:00  --------------------------------------------------------  IN: 350 mL / OUT: 2550 mL / NET: -2200 mL    08 Aug 2017 07:01  -  09 Aug 2017 05:12  --------------------------------------------------------  IN: 1070 mL / OUT: 900 mL / NET: 170 mL        PHYSICAL EXAM:  Mental Staus: Awake, Alert, Affect appropriate  Ox3  Cranial Nerves: Normal bilaterally  Motor:  Intact  Strength: 5/5 all 4 extremities  Sensation: Intact to light touch    Incision/Wound: c/d/i, daily dressing changes         DIET:    [ ] Regular    LABS:      CULTURES:      CSF Analysis:       Drug Levels:   Vancomycin Level, Trough: 14.3 ug/mL (08-07 @ 17:33)      Allergies    azithromycin (Other)    Intolerances        MEDICATIONS:  Antibiotics:  vancomycin  IVPB 1000 milliGRAM(s) IV Intermittent every 12 hours  vancomycin  IVPB   IV Intermittent   piperacillin/tazobactam IVPB. 3.375 Gram(s) IV Intermittent every 8 hours    Neuro:  acetaminophen   Tablet 650 milliGRAM(s) Oral every 6 hours PRN  oxyCODONE    5 mG/acetaminophen 325 mG 2 Tablet(s) Oral every 6 hours PRN  oxyCODONE    5 mG/acetaminophen 325 mG 1 Tablet(s) Oral every 4 hours PRN  HYDROmorphone   Tablet 1 milliGRAM(s) Oral every 6 hours PRN  diazepam    Tablet 5 milliGRAM(s) Oral every 6 hours PRN  gabapentin 300 milliGRAM(s) Oral every 8 hours    Anticoagulation  enoxaparin Injectable 40 milliGRAM(s) SubCutaneous daily    OTHER:  lisinopril 40 milliGRAM(s) Oral daily  ATENolol  Tablet 50 milliGRAM(s) Oral daily  fenofibrate Tablet 145 milliGRAM(s) Oral daily  pantoprazole    Tablet 40 milliGRAM(s) Oral before breakfast  levothyroxine 200 MICROGram(s) Oral daily  docusate sodium 100 milliGRAM(s) Oral three times a day  senna 2 Tablet(s) Oral at bedtime  insulin lispro (HumaLOG) corrective regimen sliding scale   SubCutaneous three times a day before meals  dextrose Gel 1 Dose(s) Oral once PRN  dextrose 50% Injectable 12.5 Gram(s) IV Push once  dextrose 50% Injectable 25 Gram(s) IV Push once  dextrose 50% Injectable 25 Gram(s) IV Push once  glucagon  Injectable 1 milliGRAM(s) IntraMuscular once PRN  polyethylene glycol 3350 17 Gram(s) Oral every 12 hours  ketoconazole 2% Cream 1 Application(s) Topical daily  nystatin Powder 1 Application(s) Topical daily  insulin lispro (HumaLOG) corrective regimen sliding scale   SubCutaneous at bedtime  insulin glargine Injectable (LANTUS) 55 Unit(s) SubCutaneous at bedtime  insulin lispro Injectable (HumaLOG) 16 Unit(s) SubCutaneous three times a day before meals    IVF:  cyanocobalamin 1000 MICROGram(s) Oral daily      DVT PROPHYLAXIS:  [] Venodynes                                [] Heparin/Lovenox
NEUROSURGERY    HPI:  68y/o female scheduled for lumbar laminectomy T12- L2 for resection of intradural tumor on 8/2/2017.  Pt states, "hx herniated disc, since 1/2017 has back pain, xray showed vertebrae was being pressed, MRI showed mass.  Continues to have some back pain, more on the left side." (31 Jul 2017 07:06)    Vital    ICU Vital Signs Last 24 Hrs  T(C): 37.8 (06 Aug 2017 21:40), Max: 37.8 (06 Aug 2017 21:40)  T(F): 100 (06 Aug 2017 21:40), Max: 100 (06 Aug 2017 21:40)  HR: 62 (06 Aug 2017 21:40) (62 - 70)  BP: 139/60 (06 Aug 2017 21:40) (130/60 - 148/66)  BP(mean): --  ABP: --  ABP(mean): --  RR: 16 (06 Aug 2017 21:40) (16 - 18)  SpO2: 96% (06 Aug 2017 21:40) (94% - 96%)    Exam    Awake, alert, responsive, conversant  No complaints of pain  FC+ on all 4 ext, GALVEZ with good strength  Wound dressing dry and intact  Hemodynamically stable    Labs                          10.9   15.50 )-----------( 251      ( 05 Aug 2017 19:35 )             32.8     05 Aug 2017 19:35    135    |  96     |  27     ----------------------------<  220    4.2     |  26     |  1.31     Ca    8.6        05 Aug 2017 19:35      CAPILLARY BLOOD GLUCOSE  272 (06 Aug 2017 21:40)  246 (06 Aug 2017 17:42)  281 (06 Aug 2017 12:54)  260 (06 Aug 2017 09:14)
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    Vitals    ICU Vital Signs Last 24 Hrs  T(C): 37.1 (07 Aug 2017 21:35), Max: 37.2 (07 Aug 2017 05:09)  T(F): 98.8 (07 Aug 2017 21:35), Max: 98.9 (07 Aug 2017 05:09)  HR: 66 (07 Aug 2017 21:35) (61 - 66)  BP: 139/66 (07 Aug 2017 21:35) (138/68 - 159/74)  BP(mean): --  ABP: --  ABP(mean): --  RR: 19 (07 Aug 2017 21:35) (17 - 19)  SpO2: 95% (07 Aug 2017 21:35) (95% - 98%)    Exam    Awake, alert responsive  c/o incisional pain  +FC on all 4 ext, GALVEZ with good strength  Ambulating with walker  Tolerating PO Diet, voiding without difficulty  Hemodynamically stable
NEUROSURGERY    Patient is a 67y old  Female who presents with a chief complaint of "I'm having my thyroid removed." (03 Aug 2017 06:35)    HPI:  66y/o female scheduled for lumbar laminectomy T12- L2 for resection of intradural tumor on 8/2/2017.  Pt states, "hx herniated disc, since 1/2017 has back pain, xray showed vertebrae was being pressed, MRI showed mass.  Continues to have some back pain, more on the left side." (31 Jul 2017 07:06)    Vitals    ICU Vital Signs Last 24 Hrs  T(C): 37.6 (05 Aug 2017 02:00), Max: 37.6 (05 Aug 2017 02:00)  T(F): 99.6 (05 Aug 2017 02:00), Max: 99.6 (05 Aug 2017 02:00)  HR: 77 (05 Aug 2017 02:00) (72 - 86)  BP: 139/57 (05 Aug 2017 02:00) (105/53 - 139/57)  BP(mean): --  ABP: --  ABP(mean): --  RR: 18 (05 Aug 2017 02:00) (17 - 20)  SpO2: 95% (05 Aug 2017 02:00) (91% - 96%)    Exam    Asleep ,easily arousable  C/o Back pain, PCA d/c'd yesterday  FC+ on all 4 ext, GALVEZ with good strength  Wound dressing dry and intact  Hemodynamically stable    Labs              10.9   16.08 )-----------( 235      ( 04 Aug 2017 09:56 )             34.3     04 Aug 2017 05:15    132    |  98     |  28     ----------------------------<  164    x       |  18     |  1.60     Ca    8.1        04 Aug 2017 05:15    CAPILLARY BLOOD GLUCOSE  181 (04 Aug 2017 18:15)  206 (04 Aug 2017 13:00)  182 (04 Aug 2017 07:35)    < from: US Duplex Venous Lower Ext Complete, Bilateral (08.04.17 @ 08:57) >  FINDINGS:    There is normal compressibility of the bilateral common femoral, femoral   and popliteal veins. No calf vein thrombosis is detected. The peroneal   veins were not visualized.    Doppler examination shows normal spontaneous and phasic flow.    IMPRESSION:     No evidence of bilateral lower extremity deep venous thrombosis.        < end of copied text >
NEUROSURGERY    Patient is a 67y old  Female who presents with a chief complaint of "I'm having my thyroid removed." (03 Aug 2017 06:35)    HPI:  68y/o female scheduled for lumbar laminectomy T12- L2 for resection of intradural tumor on 8/2/2017.  Pt states, "hx herniated disc, since 1/2017 has back pain, xray showed vertebrae was being pressed, MRI showed mass.  Continues to have some back pain, more on the left side." (31 Jul 2017 07:06)    Vital    ICU Vital Signs Last 24 Hrs  T(C): 36.9 (04 Aug 2017 01:19), Max: 37 (03 Aug 2017 17:39)  T(F): 98.4 (04 Aug 2017 01:19), Max: 98.6 (03 Aug 2017 17:39)  HR: 86 (04 Aug 2017 01:19) (63 - 87)  BP: 110/76 (04 Aug 2017 01:19) (103/48 - 145/68)  BP(mean): --  ABP: 135/60 (03 Aug 2017 10:00) (131/60 - 135/60)  ABP(mean): --  RR: 18 (04 Aug 2017 01:19) (12 - 20)  SpO2: 97% (04 Aug 2017 01:19) (95% - 199%)      PHYSICAL EXAM:    AAO X 3  GALVEZ strength 5/5  SILT    Dressing C/D/I                          12.8   14.66 )-----------( 290      ( 02 Aug 2017 23:00 )             39.0   08-02    139  |  101  |  29<H>  ----------------------------<  254<H>  4.8   |  19<L>  |  1.61<H>    Ca    8.9      02 Aug 2017 23:00
Neurosurgery Resident Note    Overnight Events: called by RN indicating patient is confused. Evaluated at bedside, patient is awake alert, oriented to self, place, date, and situation, 5/5 throughout. pain is well-controlled    Clinical Course: 67yof s/p resection of intradural tumor (prelim path schwannoma) w/ T12-L2 fusion. POD # 4. Patient was febrile T100.8 yesterday. CXR indicates RUE infiltrates. Patient was evaluated by medicine, who has recommended broad-spectrum coverage with vanc and zosyn.     VS: T(C): 37.3 (08-05-17 @ 21:52)  HR: 72 (08-05-17 @ 21:52)  BP: 141/60 (08-05-17 @ 21:52)  RR: 18 (08-05-17 @ 21:52)  SpO2: 93% (08-05-17 @ 21:52)  Wt(kg): --    Exam:   AAOx3    5/5 throughout, no pronator drift  Sensation intact to light touch throughout  Reflexes 2+ throughout  No dysmetria  Dressing is dry with no drainage.    Drains: none    Medications: valium, dilaudid, gabapentin                          10.9   15.50 )-----------( 251      ( 05 Aug 2017 19:35 )             32.8     08-05    135  |  96<L>  |  27<H>  ----------------------------<  220<H>  4.2   |  26  |  1.31<H>    Ca    8.6      05 Aug 2017 19:35
Neurosurgery postop  C/O incisional pain controlled with PCa  Vital Signs Last 24 Hrs  T(C): 36.8 (02 Aug 2017 23:05), Max: 36.8 (02 Aug 2017 23:05)  T(F): 98.2 (02 Aug 2017 23:05), Max: 98.2 (02 Aug 2017 23:05)  HR: 63 (03 Aug 2017 02:00) (60 - 80)  BP: 134/64 (03 Aug 2017 02:00) (134/64 - 154/86)  BP(mean): --  RR: 12 (03 Aug 2017 02:00) (12 - 19)  SpO2: 98% (03 Aug 2017 02:00) (95% - 98%)    AAO X 3  GALVEZ strength 5/5  SILT    Dressing C/D/I                          12.8   14.66 )-----------( 290      ( 02 Aug 2017 23:00 )             39.0   08-02    139  |  101  |  29<H>  ----------------------------<  254<H>  4.8   |  19<L>  |  1.61<H>    Ca    8.9      02 Aug 2017 23:00
OVERNIGHT EVENTS: Incisional pain slowly improving. No headaches. OOB to bathroom yesterday. Cough resolved. IV antibiotics stopped yesterday  and patient remains afebrile     Vital Signs Last 24 Hrs  T(C): 37 (10 Aug 2017 05:17), Max: 37.1 (09 Aug 2017 21:42)  T(F): 98.6 (10 Aug 2017 05:17), Max: 98.7 (09 Aug 2017 21:42)  HR: 63 (10 Aug 2017 05:17) (60 - 68)  BP: 116/69 (10 Aug 2017 05:17) (110/50 - 147/57)  BP(mean): --  RR: 18 (10 Aug 2017 05:17) (17 - 18)  SpO2: 97% (10 Aug 2017 05:17) (97% - 100%)    PHYSICAL EXAM:  Awake Alert Oriented x 3  Motor strength in LE 5/5  Incision intact - no active drainage noted by examination today. Dressing changed      MEDICATIONS:  Antibiotics:    Neuro:  acetaminophen   Tablet 650 milliGRAM(s) Oral every 6 hours PRN  oxyCODONE    5 mG/acetaminophen 325 mG 2 Tablet(s) Oral every 6 hours PRN  oxyCODONE    5 mG/acetaminophen 325 mG 1 Tablet(s) Oral every 4 hours PRN  HYDROmorphone   Tablet 1 milliGRAM(s) Oral every 6 hours PRN  diazepam    Tablet 5 milliGRAM(s) Oral every 6 hours PRN  gabapentin 300 milliGRAM(s) Oral every 8 hours    Anticoagulation  enoxaparin Injectable 40 milliGRAM(s) SubCutaneous daily    OTHER:  lisinopril 40 milliGRAM(s) Oral daily  ATENolol  Tablet 50 milliGRAM(s) Oral daily  fenofibrate Tablet 145 milliGRAM(s) Oral daily  pantoprazole    Tablet 40 milliGRAM(s) Oral before breakfast  levothyroxine 200 MICROGram(s) Oral daily  docusate sodium 100 milliGRAM(s) Oral three times a day  senna 2 Tablet(s) Oral at bedtime  insulin lispro (HumaLOG) corrective regimen sliding scale   SubCutaneous three times a day before meals  dextrose Gel 1 Dose(s) Oral once PRN  dextrose 50% Injectable 12.5 Gram(s) IV Push once  dextrose 50% Injectable 25 Gram(s) IV Push once  dextrose 50% Injectable 25 Gram(s) IV Push once  glucagon  Injectable 1 milliGRAM(s) IntraMuscular once PRN  polyethylene glycol 3350 17 Gram(s) Oral every 12 hours  ketoconazole 2% Cream 1 Application(s) Topical daily  nystatin Powder 1 Application(s) Topical daily  insulin lispro (HumaLOG) corrective regimen sliding scale   SubCutaneous at bedtime  insulin glargine Injectable (LANTUS) 55 Unit(s) SubCutaneous at bedtime  insulin lispro Injectable (HumaLOG) 16 Unit(s) SubCutaneous three times a day before meals    IVF:  cyanocobalamin 1000 MICROGram(s) Oral daily
Chief Complaint: DM2    History: Tolerating more po. No hypoglycemia.    MEDICATIONS  (STANDING):  lisinopril 40 milliGRAM(s) Oral daily  ATENolol  Tablet 50 milliGRAM(s) Oral daily  fenofibrate Tablet 145 milliGRAM(s) Oral daily  pantoprazole    Tablet 40 milliGRAM(s) Oral before breakfast  levothyroxine 200 MICROGram(s) Oral daily  cyanocobalamin 1000 MICROGram(s) Oral daily  docusate sodium 100 milliGRAM(s) Oral three times a day  senna 2 Tablet(s) Oral at bedtime  insulin lispro (HumaLOG) corrective regimen sliding scale   SubCutaneous three times a day before meals  dextrose 50% Injectable 12.5 Gram(s) IV Push once  dextrose 50% Injectable 25 Gram(s) IV Push once  dextrose 50% Injectable 25 Gram(s) IV Push once  enoxaparin Injectable 40 milliGRAM(s) SubCutaneous daily  polyethylene glycol 3350 17 Gram(s) Oral every 12 hours  vancomycin  IVPB 1000 milliGRAM(s) IV Intermittent every 12 hours  vancomycin  IVPB   IV Intermittent   piperacillin/tazobactam IVPB. 3.375 Gram(s) IV Intermittent every 8 hours  ketoconazole 2% Cream 1 Application(s) Topical daily  nystatin Powder 1 Application(s) Topical daily  insulin glargine Injectable (LANTUS) 50 Unit(s) SubCutaneous at bedtime  insulin lispro Injectable (HumaLOG) 12 Unit(s) SubCutaneous three times a day before meals  insulin lispro (HumaLOG) corrective regimen sliding scale   SubCutaneous at bedtime  gabapentin 300 milliGRAM(s) Oral every 8 hours    MEDICATIONS  (PRN):  acetaminophen   Tablet 650 milliGRAM(s) Oral every 6 hours PRN For Temp greater than 38 C (100.4 F)  dextrose Gel 1 Dose(s) Oral once PRN Blood Glucose LESS THAN 70 milliGRAM(s)/deciliter  glucagon  Injectable 1 milliGRAM(s) IntraMuscular once PRN Glucose LESS THAN 70 milligrams/deciliter  oxyCODONE    5 mG/acetaminophen 325 mG 2 Tablet(s) Oral every 6 hours PRN Moderate Pain (4 - 6)  oxyCODONE    5 mG/acetaminophen 325 mG 1 Tablet(s) Oral every 4 hours PRN Mild Pain (1 - 3)  HYDROmorphone   Tablet 1 milliGRAM(s) Oral every 6 hours PRN Severe Pain (7 - 10)  diazepam    Tablet 5 milliGRAM(s) Oral every 6 hours PRN muscle spasm      Allergies    azithromycin (Other)    Intolerances      Review of Systems:  Constitutional: No fever  Eyes: No blurry vision  Neuro: No tremors  HEENT: No pain  Cardiovascular: No chest pain, palpitations  Respiratory: No SOB, no cough  GI: No nausea, vomiting, abdominal pain  : No dysuria  Skin: no rash  Psych: no depression  Endocrine: no polyuria, polydipsia  Hem/lymph: no swelling  Osteoporosis: no fractures    ALL OTHER SYSTEMS REVIEWED AND NEGATIVE      PHYSICAL EXAM:  VITALS: T(C): 37.2 (08-08-17 @ 13:57)  T(F): 98.9 (08-08-17 @ 13:57), Max: 98.9 (08-08-17 @ 13:57)  HR: 72 (08-08-17 @ 13:57) (61 - 72)  BP: 138/70 (08-08-17 @ 13:57) (133/63 - 159/74)  RR:  (17 - 19)  SpO2:  (95% - 97%)  Wt(kg): --  GENERAL: NAD, well-groomed, well-developed  EYES: No proptosis, no lid lag, anicteric  HEENT:  Atraumatic, Normocephalic, moist mucous membranes  SKIN: Dry, intact, No rashes or lesions  PSYCH: Alert and oriented x 3, normal affect, normal mood    CAPILLARY BLOOD GLUCOSE  190 (08-08 @ 12:43)  172 (08-08 @ 09:10)  394 (08-07 @ 21:35)  253 (08-07 @ 18:01)  298 (08-07 @ 13:08)  242 (08-07 @ 08:41)  272 (08-06 @ 21:40)  246 (08-06 @ 17:42)  281 (08-06 @ 12:54)  260 (08-06 @ 09:14)  233 (08-05 @ 21:52)  270 (08-05 @ 16:51)      08-05    135  |  96<L>  |  27<H>  ----------------------------<  220<H>  4.2   |  26  |  1.31<H>    EGFR if : 49  EGFR if non : 42    Ca    8.6      08-05            Thyroid Function Tests:      Hemoglobin A1C, Whole Blood: 6.2 % <H> [4.0 - 5.6] (08-06-17 @ 08:15)  Hemoglobin A1C, Whole Blood: 6.0 % <H> [4.0 - 5.6] (07-31-17 @ 10:15)

## 2017-08-10 NOTE — PROGRESS NOTE ADULT - PROVIDER SPECIALTY LIST ADULT
Anesthesia
Endocrinology
Endocrinology
Neurosurgery
Pain Medicine
Pain Medicine

## 2017-08-10 NOTE — PROGRESS NOTE ADULT - PROBLEM SELECTOR PLAN 2
Continue levothyroxine 200 mcg daily
Continue insulin sliding scale coverage
Stable post op patient   Sutures to be removed on POD # 14 8/16/17
Continue levothyroxine 200 mcg daily

## 2017-08-10 NOTE — DISCHARGE NOTE ADULT - NS AS ACTIVITY OBS
Showering allowed/Stairs allowed/Sex allowed/No Heavy lifting/straining/Walking-Indoors allowed/Walking-Outdoors allowed

## 2017-08-10 NOTE — PROGRESS NOTE ADULT - PROBLEM SELECTOR PLAN 1
BG improving bu remain mildly elevated.  Increase Lantus to 55u qhs  Increase Humalog to 16/16/16  Continue moderate Humalog scale qac and moderate bedtime scale.  DC on Toujeo and Novolog, likely resume home regimen.  Outpatient follow up with endocrine Dr. Fritz.
Antibiotics stopped yesterday- Cough improved.   Afebrile since 8/5  CXR obtained- no evidence of pneumonia on preliminary review- awaiting official read
D/c Planning
Flat X 48 hours  Pain management
HOB flat x 24 hours ending 2300 today  Pain Management  PT/OT Consult
Increase activity as tolerated  PT/OT consult  Pain Management
Increase activity as tolerated  Rehab placement
OOB/PT  Rehab planning  Continue wound checks/wound consult
q4h neurocheck  PT/OOB  f/u cultures  f/u UA  antibiotics per medicine
Increase Lantus to 50u qhs  Increase Humalog to 12/12/12  Continue moderate Humalog scale qac and moderate bedtime scale.  DC on Toujeo and Novolog, likely resume home regimen.  Outpatient follow up with endocrine Dr. Fritz.

## 2017-08-10 NOTE — DISCHARGE NOTE ADULT - CARE PLAN
Principal Discharge DX:	Schwannoma of spinal cord  Goal:	for improvement of symptoms  Instructions for follow-up, activity and diet:	Sutures to be removed on 8/16/17 (POD #14). May be removed in rehab or please arrange transfer to Dr. Hodge's office for him to remove otherwise please Follow up with Dr. Hodge after rehab.   Continue with daily dressing changes as it was done in the hospital   LUMBAR WOUND CARE INSTRUCTION:   Cleanse with normal saline, pat dry. Apply Liquid barrier film to periwound skin. Apply Aquacel AG hydrofiber over incision, cover with foam with border. Change daily and PRN.  Notify Dr. Hodge immediately if significant drainage noted from wound or high fevers.  At time of discharge scant drainage noted on dressing that was not reproducible.  May get incision wet in regular shower- ensure to pat area dry with clean towel.  Secondary Diagnosis:	Diabetes mellitus  Goal:	Strict blood sugar control  Instructions for follow-up, activity and diet:	Continue with diabetic diet  Continue with home medications

## 2017-08-10 NOTE — DIETITIAN INITIAL EVALUATION ADULT. - NS AS NUTRI INTERV MEALS SNACK
Carbohydrate - modified diet/Other (specify)/1. Continue current diet order, which remains appropriate at this time. 2. Monitor weights, labs, BM's, skin integrity, p.o. intake.

## 2017-08-10 NOTE — DISCHARGE NOTE ADULT - PATIENT PORTAL LINK FT
“You can access the [facility] Patient Portal, offered by [facility], by registering with the following website: [email or web address]”

## 2017-08-10 NOTE — PROGRESS NOTE ADULT - PROBLEM SELECTOR PROBLEM 1
Type 2 diabetes mellitus with hyperglycemia, with long-term current use of insulin
Type 2 diabetes mellitus with hyperglycemia, with long-term current use of insulin
Fever
Intradural tumor
S/P lumbar laminectomy

## 2017-08-10 NOTE — DISCHARGE NOTE ADULT - INSTRUCTIONS
1120 - Received report from [facility]. Pt returning to room from ordered procedure. Pt A&O x 4. Respirations even and unlabored on 2L O2 via humidified NC. No acute distress noted. Cardiac rehab nurse and  in room. Vit K IV started as ordered. 12 - Family practice notified that per Yeny Forde from ultrasound, R leg DVT still present. Dr. Allyssa Coppola acknowledged. NNO.    1500 - Family practice, Dr. Allyssa Coppola on floor, notified that pt still has had no BM. Per pt it has been about 2 weeks. Pt had been given ceasar colace, miralax and dulcolax today with no luck. MD acknowledged and put in orders for tap water enema. 1920 - Bedside and Verbal shift change report given to Jorden Gordon (oncoming nurse) by [place] (offgoing nurse). Report included the following information SBAR, Kardex, Intake/Output, Accordion, Recent Results and Cardiac Rhythm NSR-SB. Diabetic diet

## 2017-08-10 NOTE — DISCHARGE NOTE ADULT - CARE PROVIDER_API CALL
Elias Hodge (MD), Neurosurgery  General  [address] [address]  Phone: [phone]  Fax: [phone]

## 2017-08-10 NOTE — DISCHARGE NOTE ADULT - MEDICATION SUMMARY - MEDICATIONS TO TAKE
I will START or STAY ON the medications listed below when I get home from the hospital:    acetaminophen 325 mg oral tablet  -- 2 tab(s) by mouth every 6 hours, As needed, For Temp greater than 38 C (100.4 F)  -- Indication: For PRN Fever    acetaminophen-oxycodone 325 mg-5 mg oral tablet  -- 2 tab(s) by mouth every 6 hours, As needed, Moderate Pain (4 - 6)  -- Indication: For PRN moderate pain    acetaminophen-oxycodone 325 mg-5 mg oral tablet  -- 1 tab(s) by mouth every 4 hours, As needed, Mild Pain (1 - 3)  -- Indication: For PRN mild pain    ramipril 10 mg oral capsule  -- 1 tab(s) by mouth 2 times a day  -- Indication: For Home med    gabapentin 300 mg oral tablet  -- 1 tab(s) by mouth once a day  -- Indication: For Home med    diazePAM 5 mg oral tablet  -- 1 tab(s) by mouth every 6 hours, As needed, muscle spasm  -- Indication: For PRN muscle spasm    Toujeo SoloStar 300 units/mL subcutaneous solution  -- 72 unit(s) subcutaneous once a day (at bedtime)  -- Indication: For Home med    NovoLOG FlexPen 100 units/mL subcutaneous solution  -- 1 dose(s) subcutaneous 3 times a day, 28 units with breakfast, 30 units with lunch, 34 units with dinner  -- Indication: For Home med    fenofibrate 130 mg oral capsule  -- 1 cap(s) by mouth once a day  -- Indication: For Home med    Zetia 10 mg oral tablet  -- 1 tab(s) by mouth once a day (at bedtime)  -- Indication: For Home med    atenolol 50 mg oral tablet  -- 1 tab(s) by mouth once a day  -- Indication: For Home med    nystatin 100,000 units/g topical powder  -- 1 application on skin once a day  -- Indication: For Home med    docusate sodium 100 mg oral capsule  -- 1 cap(s) by mouth 3 times a day  -- Indication: For Constipation    polyethylene glycol 3350 oral powder for reconstitution  -- 17 gram(s) by mouth every 12 hours  -- Indication: For Constipation    Melatonin  -- 1 tab(s) by mouth once a day, As Needed  -- Indication: For Home med    omeprazole 40 mg oral delayed release capsule  -- 1 cap(s) by mouth once a day  -- Indication: For Home med    Synthroid 200 mcg (0.2 mg) oral tablet  -- 1 tab(s) by mouth once a day  -- Indication: For Home med    calcium-vitamin D  -- 1 tab(s) by mouth once a day  -- Indication: For Home med    Vitamin B-12 1000 mcg oral tablet  -- 1 tab(s) by mouth once a day  -- Indication: For Home med

## 2017-08-11 PROBLEM — B02.9 ZOSTER WITHOUT COMPLICATIONS: Chronic | Status: ACTIVE | Noted: 2017-07-31

## 2017-08-15 ENCOUNTER — APPOINTMENT (OUTPATIENT)
Dept: NEUROSURGERY | Facility: CLINIC | Age: 67
End: 2017-08-15

## 2017-08-29 ENCOUNTER — APPOINTMENT (OUTPATIENT)
Dept: NEUROSURGERY | Facility: CLINIC | Age: 67
End: 2017-08-29
Payer: COMMERCIAL

## 2017-08-29 VITALS
DIASTOLIC BLOOD PRESSURE: 59 MMHG | WEIGHT: 228 LBS | HEIGHT: 66 IN | SYSTOLIC BLOOD PRESSURE: 120 MMHG | BODY MASS INDEX: 36.64 KG/M2 | HEART RATE: 67 BPM

## 2017-08-29 PROCEDURE — 99024 POSTOP FOLLOW-UP VISIT: CPT

## 2017-08-29 RX ORDER — OXYCODONE 10 MG/1
10 TABLET ORAL EVERY 6 HOURS
Qty: 20 | Refills: 0 | Status: ACTIVE | COMMUNITY
Start: 2017-08-29 | End: 1900-01-01

## 2017-09-14 ENCOUNTER — RX RENEWAL (OUTPATIENT)
Age: 67
End: 2017-09-14

## 2017-09-15 NOTE — H&P PST ADULT - HEIGHT IN CM
This is a 82F PMH of CAD, CABG, dementia and Type II DM, HTN, anemia of chronic disease, HLD, who fell on Monday (9/11) at home while standing, 24hr Aide was in another room brought in for evaluation of persistent left leg pain fount to have left intertrochanteric fracture. She was admitted with left hip fracture. Underwent IM nail with ortho 9/13. Post op required 1 unit prbcs for anemia. She is otherwise hemodynamically stable and  She will be discharged home today. She has been advised outpt f/u with Dr. Stock.   For physical exam please see progress note from 9/15/17    LABS:                        8.0    6.7   )-----------( 177      ( 15 Sep 2017 05:38 )             24.1     09-15    142  |  108  |  26<H>  ----------------------------<  161<H>  4.3   |  29  |  0.88    Ca    8.3<L>      15 Sep 2017 05:38  Phos  2.1     09-15  Mg     1.9     09-15      PT/INR - ( 14 Sep 2017 05:36 )   PT: 12.3 sec;   INR: 1.14 ratio       < from: CT 3D Reconstruct w/ Workstation (09.13.17 @ 11:39) >    Impression: A non displaced comminuted intertrochanteric fracture   involving the proximal left femur as described.   No evidence of pelvic fractures.      Final diagnosis:  1. [employer] fall/Left hip fracture:  -s/p IM nail POD#2  2. Acute on chronic anemia due to blood loss   3. GERD:  4. HTN:  5. DMII:  6. Dementia:      Time taken in preparation for dc 60min  left message for dr. livingston re: dc. 162.56

## 2018-01-28 ENCOUNTER — FORM ENCOUNTER (OUTPATIENT)
Age: 68
End: 2018-01-28

## 2018-01-29 ENCOUNTER — OUTPATIENT (OUTPATIENT)
Dept: OUTPATIENT SERVICES | Facility: HOSPITAL | Age: 68
LOS: 1 days | End: 2018-01-29
Payer: MEDICARE

## 2018-01-29 ENCOUNTER — APPOINTMENT (OUTPATIENT)
Dept: RADIOLOGY | Facility: CLINIC | Age: 68
End: 2018-01-29

## 2018-01-29 ENCOUNTER — CLINICAL ADVICE (OUTPATIENT)
Age: 68
End: 2018-01-29

## 2018-01-29 ENCOUNTER — APPOINTMENT (OUTPATIENT)
Dept: MRI IMAGING | Facility: CLINIC | Age: 68
End: 2018-01-29

## 2018-01-29 DIAGNOSIS — G56.00 CARPAL TUNNEL SYNDROME, UNSPECIFIED UPPER LIMB: Chronic | ICD-10-CM

## 2018-01-29 DIAGNOSIS — E89.0 POSTPROCEDURAL HYPOTHYROIDISM: Chronic | ICD-10-CM

## 2018-01-29 DIAGNOSIS — Z00.8 ENCOUNTER FOR OTHER GENERAL EXAMINATION: ICD-10-CM

## 2018-01-29 PROCEDURE — 72158 MRI LUMBAR SPINE W/O & W/DYE: CPT

## 2018-01-29 PROCEDURE — A9585: CPT

## 2018-01-29 PROCEDURE — 72110 X-RAY EXAM L-2 SPINE 4/>VWS: CPT | Mod: 26

## 2018-01-29 PROCEDURE — 82565 ASSAY OF CREATININE: CPT

## 2018-01-29 PROCEDURE — 72110 X-RAY EXAM L-2 SPINE 4/>VWS: CPT

## 2018-01-29 PROCEDURE — 72158 MRI LUMBAR SPINE W/O & W/DYE: CPT | Mod: 26

## 2018-02-20 ENCOUNTER — APPOINTMENT (OUTPATIENT)
Dept: NEUROSURGERY | Facility: CLINIC | Age: 68
End: 2018-02-20
Payer: MEDICARE

## 2018-02-20 VITALS
HEIGHT: 65.5 IN | DIASTOLIC BLOOD PRESSURE: 79 MMHG | HEART RATE: 71 BPM | SYSTOLIC BLOOD PRESSURE: 148 MMHG | WEIGHT: 230 LBS | BODY MASS INDEX: 37.86 KG/M2

## 2018-02-20 DIAGNOSIS — Z98.890 OTHER SPECIFIED POSTPROCEDURAL STATES: ICD-10-CM

## 2018-02-20 PROCEDURE — 99214 OFFICE O/P EST MOD 30 MIN: CPT

## 2018-02-21 PROBLEM — Z98.890 STATUS POST LUMBAR SURGERY: Status: ACTIVE | Noted: 2017-08-30

## 2018-07-11 ENCOUNTER — APPOINTMENT (OUTPATIENT)
Dept: SURGERY | Facility: CLINIC | Age: 68
End: 2018-07-11

## 2019-11-08 PROBLEM — D49.7 NEOPLASM OF UNSPECIFIED BEHAVIOR OF ENDOCRINE GLANDS AND OTHER PARTS OF NERVOUS SYSTEM: Chronic | Status: ACTIVE | Noted: 2017-07-31

## 2019-11-08 PROBLEM — E66.9 OBESITY, UNSPECIFIED: Chronic | Status: ACTIVE | Noted: 2017-07-31

## 2020-02-11 ENCOUNTER — APPOINTMENT (OUTPATIENT)
Dept: NEUROSURGERY | Facility: CLINIC | Age: 70
End: 2020-02-11
Payer: MEDICARE

## 2020-02-11 VITALS
DIASTOLIC BLOOD PRESSURE: 75 MMHG | WEIGHT: 230 LBS | HEART RATE: 66 BPM | BODY MASS INDEX: 37.86 KG/M2 | HEIGHT: 65.5 IN | RESPIRATION RATE: 17 BRPM | SYSTOLIC BLOOD PRESSURE: 138 MMHG | OXYGEN SATURATION: 97 % | TEMPERATURE: 99.3 F

## 2020-02-11 DIAGNOSIS — Z98.1 ARTHRODESIS STATUS: ICD-10-CM

## 2020-02-11 DIAGNOSIS — D49.7 NEOPLASM OF UNSPECIFIED BEHAVIOR OF ENDOCRINE GLANDS AND OTHER PARTS OF NERVOUS SYSTEM: ICD-10-CM

## 2020-02-11 DIAGNOSIS — D33.4 BENIGN NEOPLASM OF SPINAL CORD: ICD-10-CM

## 2020-02-11 DIAGNOSIS — Z87.891 PERSONAL HISTORY OF NICOTINE DEPENDENCE: ICD-10-CM

## 2020-02-11 PROCEDURE — 99213 OFFICE O/P EST LOW 20 MIN: CPT

## 2020-02-12 PROBLEM — D33.4 SCHWANNOMA OF SPINAL CORD: Status: ACTIVE | Noted: 2017-08-29

## 2020-02-12 PROBLEM — D49.7 INTRADURAL TUMOR: Status: ACTIVE | Noted: 2017-07-18

## 2020-02-12 PROBLEM — Z98.1 S/P LUMBAR FUSION: Status: ACTIVE | Noted: 2020-02-06

## 2020-02-17 NOTE — REVIEW OF SYSTEMS
[As Noted in HPI] : as noted in HPI [Negative] : Endocrine [Vomiting] : no vomiting [Abdominal Pain] : no abdominal pain [Diarrhea] : no diarrhea [Incontinence] : no incontinence [Dysuria] : no dysuria [Skin Lesions] : no skin lesions [Easy Bleeding] : no tendency for easy bleeding [Skin Wound] : no skin wound [Itching] : no itching [Easy Bruising] : no tendency for easy bruising

## 2020-02-17 NOTE — ASSESSMENT
[FreeTextEntry1] : No evidence of Schwannoma on most recent MRI.  Follow up in 5 years.  She will contact our office in 2025.\par \par f/u 5 years

## 2020-02-17 NOTE — HISTORY OF PRESENT ILLNESS
[FreeTextEntry1] : Mrs. Restrepo is a 69 yo woman with PMH of HTN, DM1, CKD3, and thyroidectomy, s/p 8/2/17 T12-L1 posterior fusion and laminectomy for resection of L1 schwannoma.  She arrives for 1 year f/u with complaint of low back pain with onset of 11/2019 being followed by ortho Dr. Huber in [place], [place].  Injections and prednisone provided relief.    \par \par The surgical incision remains clean, dry, well approximated, and well healed without sings of irritation, drainage, or infection.

## 2020-02-17 NOTE — PHYSICAL EXAM
[General Appearance - Alert] : alert [General Appearance - In No Acute Distress] : in no acute distress [General Appearance - Well Nourished] : well nourished [General Appearance - Well Developed] : well developed [Oriented To Time, Place, And Person] : oriented to person, place, and time [Impaired Insight] : insight and judgment were intact [Affect] : the affect was normal [Mood] : the mood was normal [Cranial Nerves Optic (II)] : visual acuity intact bilaterally,  pupils equal round and reactive to light [Cranial Nerves Oculomotor (III)] : extraocular motion intact [Cranial Nerves Trigeminal (V)] : facial sensation intact symmetrically [Cranial Nerves Facial (VII)] : face symmetrical [Cranial Nerves Vestibulocochlear (VIII)] : hearing was intact bilaterally [Cranial Nerves Glossopharyngeal (IX)] : tongue and palate midline [Cranial Nerves Accessory (XI - Cranial And Spinal)] : head turning and shoulder shrug symmetric [Cranial Nerves Hypoglossal (XII)] : there was no tongue deviation with protrusion [Motor Strength] : muscle strength was normal in all four extremities [Involuntary Movements] : no involuntary movements were seen [No Pain with ROM] : no pain with motion in any direction [Normal] : normal [Able to toe walk] : the patient was able to toe walk [Able to heel walk] : the patient was able to heel walk [Intact] : all reflexes within normal limits bilaterally [PERRL With Normal Accommodation] : pupils were equal in size, round, reactive to light, with normal accommodation [Neck Appearance] : the appearance of the neck was normal [Hearing Threshold Finger Rub Not [place]] : hearing was normal [] : no respiratory distress [Heart Sounds] : normal S1 and S2 [No Spinal Tenderness] : no spinal tenderness [Motor Tone] : muscle strength and tone were normal [Skin Color & Pigmentation] : normal skin color and pigmentation [5] : S1 ankle flexors 5/5 [Sensation Tactile Decrease] : light touch was intact [Abnormal Walk] : normal gait [Limited Balance] : balance was intact [Romberg's Sign] : Romberg's sign was negtive [Plantar Reflex Right Only] : normal on the right [Tremor] : no tremor present [Plantar Reflex Left Only] : normal on the left [___] : absent on the left [___] : absent on the right [Straight-Leg Raise Test - Right] : straight leg raise  of the right leg was negative [Straight-Leg Raise Test - Left] : straight leg raise of the left leg was negative

## 2021-11-02 NOTE — H&P PST ADULT - TEACHING/LEARNING FACTORS INFLUENCE READINESS TO LEARN
For information on Fall & injury Prevention, visit [email or web address]

## 2025-01-14 NOTE — PATIENT PROFILE ADULT. - NS PRO OT REFERRAL QUES 2 YN
no